# Patient Record
Sex: MALE | Race: WHITE | NOT HISPANIC OR LATINO | Employment: OTHER | URBAN - METROPOLITAN AREA
[De-identification: names, ages, dates, MRNs, and addresses within clinical notes are randomized per-mention and may not be internally consistent; named-entity substitution may affect disease eponyms.]

---

## 2017-02-22 ENCOUNTER — OFFICE VISIT (OUTPATIENT)
Dept: FAMILY MEDICINE | Facility: CLINIC | Age: 67
End: 2017-02-22

## 2017-02-22 VITALS
OXYGEN SATURATION: 96 % | DIASTOLIC BLOOD PRESSURE: 78 MMHG | TEMPERATURE: 98 F | HEART RATE: 94 BPM | HEIGHT: 74 IN | WEIGHT: 217 LBS | BODY MASS INDEX: 27.85 KG/M2 | SYSTOLIC BLOOD PRESSURE: 130 MMHG

## 2017-02-22 DIAGNOSIS — R05.9 COUGH: Primary | ICD-10-CM

## 2017-02-22 LAB
FLUAV AG UPPER RESP QL IA.RAPID: NORMAL
FLUBV AG UPPER RESP QL IA.RAPID: NORMAL

## 2017-02-22 PROCEDURE — 99213 OFFICE O/P EST LOW 20 MIN: CPT | Performed by: FAMILY MEDICINE

## 2017-02-22 PROCEDURE — 87804 INFLUENZA ASSAY W/OPTIC: CPT | Performed by: FAMILY MEDICINE

## 2017-02-22 RX ORDER — BENZONATATE 100 MG/1
100 CAPSULE ORAL 3 TIMES DAILY PRN
Qty: 42 CAPSULE | Refills: 0 | Status: SHIPPED | OUTPATIENT
Start: 2017-02-22 | End: 2018-03-12

## 2017-02-22 NOTE — MR AVS SNAPSHOT
"              After Visit Summary   2017    Felix Millard    MRN: [id number]           Patient Information     Date Of Birth          1950        Visit Information        Provider Department      2017 2:00 PM Diego Feliciano MD [facility]        Today's Diagnoses     Cough    -  1       Follow-ups after your visit        Who to contact     If you have questions or need follow up information about today's clinic visit or your schedule please contact [facility] directly at [phone].  Normal or non-critical lab and imaging results will be communicated to you by MyChart, letter or phone within 4 business days after the clinic has received the results. If you do not hear from us within 7 days, please contact the clinic through [employer] or phone. If you have a critical or abnormal lab result, we will notify you by phone as soon as possible.  Submit refill requests through [employer] or call your pharmacy and they will forward the refill request to us. Please allow 3 business days for your refill to be completed.          Additional Information About Your Visit        MyChart Information     [employer] lets you send messages to your doctor, view your test results, renew your prescriptions, schedule appointments and more. To sign up, go to [email or web address] . Click on \"Log in\" on the left side of the screen, which will take you to the Welcome page. Then click on \"Sign up Now\" on the right side of the page.     You will be asked to enter the access code listed below, as well as some personal information. Please follow the directions to create your username and password.     Your access code is: [email or web address]  Expires: 2017  2:22 PM     Your access code will  in 90 days. If you need help or a new code, please call your [facility] clinic or [phone].        Care EveryWhere ID     This is your Care EveryWhere ID. This could be used by other organizations to access your " "[facility] medical records  [id number]        Your Vitals Were     Pulse Temperature Height Pulse Oximetry BMI (Body Mass Index)       94 98  F (36.7  C) 1.886 m (6' 2.25\") 96% 27.67 kg/m2        Blood Pressure from Last 3 Encounters:   02/22/17 130/78   08/22/16 118/86   08/15/16 164/80    Weight from Last 3 Encounters:   02/22/17 98.4 kg (217 lb)   08/15/16 93.4 kg (206 lb)   05/16/16 91.2 kg (201 lb)              We Performed the Following     Influenza Testing ([facility])          Today's Medication Changes          These changes are accurate as of: 2/22/17  2:22 PM.  If you have any questions, ask your nurse or doctor.               Start taking these medicines.        Dose/Directions    benzonatate 100 MG capsule   Commonly known as:  TESSALON   Used for:  Cough   Started by:  Diego Feliciano MD        Dose:  100 mg   Take 1 capsule (100 mg) by mouth 3 times daily as needed for cough   Quantity:  42 capsule   Refills:  0            Where to get your medicines      These medications were sent to [id number] [place] [facility] [address]  [address] [address]     Phone:  [phone]     benzonatate 100 MG capsule                Primary Care Provider Office Phone # Fax #    Marvel De La Fuente [phone]       [facility] [address] [facility] [address]        Thank you!     Thank you for choosing [facility]  for your care. Our goal is always to provide you with excellent care. Hearing back from our patients is one way we can continue to improve our services. Please take a few minutes to complete the written survey that you may receive in the mail after your visit with us. Thank you!             Your Updated Medication List - Protect others around you: Learn how to safely use, store and throw away your medicines at www.disposemymeds.org.          This list is accurate as of: 2/22/17  2:22 PM.  Always use your most " recent med list.                   Brand Name Dispense Instructions for use    benzonatate 100 MG capsule    TESSALON    42 capsule    Take 1 capsule (100 mg) by mouth 3 times daily as needed for cough       CENTRUM PO      Take 1 tablet by mouth daily Reported on 2/22/2017       COLACE PO          EXCEDRIN EXTRA STRENGTH PO      Take 1 tablet by mouth 2 times daily as needed       RITALIN PO      Take 20 mg by mouth 2 times daily Reported on 2/22/2017       VITAMIN D (CHOLECALCIFEROL) PO      Take 1,000 Units by mouth daily       WELLBUTRIN  MG 12 hr tablet   Generic drug:  buPROPion      Take 150 mg by mouth daily.

## 2017-02-22 NOTE — PROGRESS NOTES
"1 days of runny nose, dry cough and myalgias.. No fever or chills. No SOB or chest pain.  Nonsmoker.  ROS: no nausea or vomiting  OBJECTIVE: /78 (BP Location: Right arm)  Pulse 94  Temp 98  F (36.7  C)  Ht 1.886 m (6' 2.25\")  Wt 98.4 kg (217 lb)  SpO2 96%  BMI 27.67 kg/m2   NAD except fatigue. TM's OK. Turbinates pale and swollen. Pharynx injected. No nodes. Lungs are clear, and cor RRR.  (R05) Cough  (primary encounter diagnosis)  Comment:   Plan: [place] benzonatate (TESSALON)        100 MG capsule                "

## 2017-06-28 ENCOUNTER — OFFICE VISIT (OUTPATIENT)
Dept: FAMILY MEDICINE | Facility: CLINIC | Age: 67
End: 2017-06-28

## 2017-06-28 VITALS
WEIGHT: 216.6 LBS | HEART RATE: 87 BPM | DIASTOLIC BLOOD PRESSURE: 82 MMHG | BODY MASS INDEX: 27.62 KG/M2 | RESPIRATION RATE: 20 BRPM | SYSTOLIC BLOOD PRESSURE: 130 MMHG | TEMPERATURE: 98.4 F | OXYGEN SATURATION: 91 %

## 2017-06-28 DIAGNOSIS — G44.209 TENSION HEADACHE: ICD-10-CM

## 2017-06-28 DIAGNOSIS — R35.0 FREQUENCY OF URINATION: Primary | ICD-10-CM

## 2017-06-28 DIAGNOSIS — J06.9 UPPER RESPIRATORY TRACT INFECTION, UNSPECIFIED TYPE: ICD-10-CM

## 2017-06-28 LAB
BACTERIA URINE: NORMAL
BILIRUB UR QL STRIP: 0
BLOOD URINE DIP: 0
CASTS/LPF: NORMAL
COLOR UR: YELLOW
CRYSTAL URINE: NORMAL
EPITHELIAL CELLS - QUEST: NORMAL
GLUCOSE UR STRIP-MCNC: 0 MG/DL
KETONES UR QL STRIP: 0
LEUKOCYTE ESTERASE URINE DIP: 0
MUCOUS URINE: NORMAL
NITRITE UR QL STRIP: NORMAL
PH UR STRIP: 6 PH (ref 5–9)
PROT UR QL: 0 MG/DL (ref ?–0.01)
RBC URINE: NORMAL (ref 0–3)
SP GR UR STRIP: 1.01 (ref 1–1.02)
UROBILINOGEN UR QL STRIP: 0.2 EU/DL (ref 0.2–1)
WBC URINE: NORMAL (ref 0–3)

## 2017-06-28 PROCEDURE — 99214 OFFICE O/P EST MOD 30 MIN: CPT | Performed by: FAMILY MEDICINE

## 2017-06-28 PROCEDURE — 81003 URINALYSIS AUTO W/O SCOPE: CPT | Performed by: FAMILY MEDICINE

## 2017-06-28 RX ORDER — AZITHROMYCIN 250 MG/1
TABLET, FILM COATED ORAL
Qty: 6 TABLET | Refills: 0 | Status: SHIPPED | OUTPATIENT
Start: 2017-06-28 | End: 2018-03-12

## 2017-06-28 NOTE — MR AVS SNAPSHOT
"              After Visit Summary   6/28/2017    Felix Millard    MRN: [id number]           Patient Information     Date Of Birth          1950        Visit Information        Provider Department      6/28/2017 10:45 AM Manny Cunningham MD [facility]        Today's Diagnoses     Frequency of urination    -  1    Tension headache        Upper respiratory tract infection, unspecified type          Care Instructions      * Tension Headache    Muscle Tension Headache (also called \"stress headache\") is a very common cause of head pain. Under stress, some people tense the muscles of their shoulder, neck and scalp without knowing it. If this lasts long enough, a headache can occur. These headaches can be very painful and last for hours or even days.  Home Care:    If you were given pain medicine for this headache, do not drive yourself home. Arrange for a ride, instead. When you get home, try to sleep. You should feel much better when you wake up.    Heat to the back of your neck may relieve neck spasm.    Drink only clear liquids or eat a very light diet to avoid nausea/vomiting until symptoms improve.  Preventing Future Headaches    Identify the sources of stress in your life. These may not be obvious! Learn new ways to handle your stress, such as regular exercise, biofeedback, self-hypnosis and meditation. For more information about this, consult your doctor or go to a local bookstore and review the many books and tapes on this subject.    At the first sign of a tension headache, take time out if possible. Remove yourself from the stressful situation, find a quiet comfortable place to sit or lie down and let yourself relax. Heat and deep massage of the tight areas in the neck and shoulders may help reduce muscle spasm. Medicine, such as ibuprofen (Advil or Motrin) or a prescribed muscle relaxant may be helpful at this point.  Follow Up with your doctor if the headache is not better within the next 24 " hours. If you have frequent headaches you should discuss a treatment plan with your primary care doctor. Ask if you can have medicine to take at home the next time you get a bad headache. This may avoid the need for a visit to the emergency department in the future. Poorly controlled chronic headaches may require a referral to a neurologist (headache specialist).  Call your Doctor Or Get Prompt Medical Attention if any of the following occur:    Worsening of your head pain or no improvement within 24 hours    Repeated vomiting (unable to keep liquids down)    Fever of 100.4 F (38.0 C) or higher, or as directed by your healthcare provider    Stiff neck    Extreme drowsiness, confusion or fainting    Dizziness, vertigo (dizziness with spinning sensation)    Weakness of an arm or leg or one side of the face    Difficulty with speech or vision    7280-3695 [facility], [address] [place] [address]. All rights reserved. This information is not intended as a substitute for professional medical care. Always follow your healthcare professional's instructions.                Follow-ups after your visit        Who to contact     If you have questions or need follow up information about today's clinic visit or your schedule please contact [facility] directly at [phone].  Normal or non-critical lab and imaging results will be communicated to you by MyChart, letter or phone within 4 business days after the clinic has received the results. If you do not hear from us within 7 days, please contact the clinic through [facility] or phone. If you have a critical or abnormal lab result, we will notify you by phone as soon as possible.  Submit refill requests through [employer] or call your pharmacy and they will forward the refill request to us. Please allow 3 business days for your refill to be completed.          Additional Information About Your Visit        [facility] Information     [employer] lets you send  "messages to your doctor, view your test results, renew your prescriptions, schedule appointments and more. To sign up, go to [email or web address] . Click on \"Log in\" on the left side of the screen, which will take you to the Welcome page. Then click on \"Sign up Now\" on the right side of the page.     You will be asked to enter the access code listed below, as well as some personal information. Please follow the directions to create your username and password.     Your access code is: [email or web address]  Expires: 2017 11:25 AM     Your access code will  in 90 days. If you need help or a new code, please call your [place] clinic or [phone].        Care EveryWhere ID     This is your Care EveryWhere ID. This could be used by other organizations to access your [place] medical records  [id number]        Your Vitals Were     Pulse Temperature Respirations Pulse Oximetry BMI (Body Mass Index)       87 98.4  F (36.9  C) (Oral) 20 91% 27.62 kg/m2        Blood Pressure from Last 3 Encounters:   17 130/82   17 130/78   16 118/86    Weight from Last 3 Encounters:   17 98.2 kg (216 lb 9.6 oz)   17 98.4 kg (217 lb)   08/15/16 93.4 kg (206 lb)              We Performed the Following     Urinalysis w/reflex protein, bili ([facility])          Today's Medication Changes          These changes are accurate as of: 17 11:25 AM.  If you have any questions, ask your nurse or doctor.               Start taking these medicines.        Dose/Directions    azithromycin 250 MG tablet   Commonly known as:  ZITHROMAX   Used for:  Upper respiratory tract infection, unspecified type   Started by:  Manny Cunningham MD        2 pills to start then one daily   Quantity:  6 tablet   Refills:  0            Where to get your medicines      These medications [id number] [place] [facility] [address]  [address] [address] [facility] [address]     Phone:  [phone]     " azithromycin 250 MG tablet                Primary Care Provider Office Phone # Fax #    Marvel De La Fuente [phone]       [facility] [address]  [facility] [address]        Equal Access to Services     SONIA AVILES : Hadjessica brandi ku hadwaleskao Soomaali, waaxda luqadaha, qaybta kaalmada adeegyada, alma brionesn chivo barker lamilanagloria vicente. So [facility] [phone].    ATENCIÓN: Si habla español, tiene a brunner disposición servicios gratuitos de asistencia lingüística. Llame al [phone].    We comply with applicable federal civil rights laws and [place] laws. We do not discriminate on the basis of race, color, national origin, age, disability sex, sexual orientation or gender identity.            Thank you!     Thank you for choosing [facility]  for your care. Our goal is always to provide you with excellent care. Hearing back from our patients is one way we can continue to improve our services. Please take a few minutes to complete the written survey that you may receive in the mail after your visit with us. Thank you!             Your Updated Medication List - Protect others around you: Learn how to safely use, store and throw away your medicines at www.disposemymeds.org.          This list is accurate as of: 6/28/17 11:25 AM.  Always use your most recent med list.                   Brand Name Dispense Instructions for use Diagnosis    azithromycin 250 MG tablet    ZITHROMAX    6 tablet    2 pills to start then one daily    Upper respiratory tract infection, unspecified type       benzonatate 100 MG capsule    TESSALON    42 capsule    Take 1 capsule (100 mg) by mouth 3 times daily as needed for cough    Cough       CENTRUM PO      Take 1 tablet by mouth daily Reported on 2/22/2017        COLACE PO           EXCEDRIN EXTRA STRENGTH PO      Take 1 tablet by mouth 2 times daily as needed    Encounter for medication refill       RITALIN PO      Take 20 mg by mouth 2  times daily Reported on 2/22/2017        VITAMIN D (CHOLECALCIFEROL) PO      Take 1,000 Units by mouth daily        WELLBUTRIN  MG 12 hr tablet   Generic drug:  buPROPion      Take 150 mg by mouth daily.

## 2017-06-28 NOTE — PROGRESS NOTES
Subjective:  Here to discuss the status of the following problem(s):(Unless noted the problem(s) is/are stable and if applicable the medicine(s) being used is/are causing no side effects or problems.)    CC: Cough (deep hacking cough); Headache (body aches x 5 d); and Urinary Problem (frequency in noc)      Patient is a 67-year-old man who is generally healthy.  He presents with complaints of a cough that makes his headache quite severe whenever he coughs.  This been going on for about four days he states that he's had body aches when ice asked him to specify what that means he says his knees and hips are painful.  He states that his neck is a little uncomfortable when he rotates side-to-side but  but looking up and down is not a problem.  With respect to the cough he states he is not short of breath he does not bring up mucus.  He has a strong feeling that he has an upper respiratory infection.  He started our visit by stating that if I don't get an antibiotic I usually come back within 10 days because I need one.       ROS:    No fever, no chills  Pulmonary no shortness of breath for mucus production no wheezing  Cardiac no chest pressure or heaviness.  GI no diarrhea or abdominal pain   frequent urination he mentions this particularly at night.  Skin no recent rashes.  Past Medical History:   Diagnosis Date     ADD (attention deficit disorder)      Depressive disorder, not elsewhere classified      Diverticulosis      Eczema      History of shingles      Hyperlipidaemia LDL goal < 100      Major depression in complete remission (H) 8/15/2016     Seasonal allergies      Tinnitus      Current Outpatient Prescriptions   Medication     azithromycin (ZITHROMAX) 250 MG tablet     benzonatate (TESSALON) 100 MG capsule     Docusate Sodium (COLACE PO)     VITAMIN D, CHOLECALCIFEROL, PO     Methylphenidate HCl (RITALIN PO)     Aspirin-Acetaminophen-Caffeine (EXCEDRIN EXTRA STRENGTH PO)     buPROPion (WELLBUTRIN SR) 150  MG 12 hr tablet     Multiple Vitamins-Minerals (CENTRUM PO)     No current facility-administered medications for this visit.       reports that he quit smoking about 15 years ago. His smoking use included Cigarettes. He has never used smokeless tobacco. He reports that he does not drink alcohol or use illicit drugs.      EXAM:  BP: 130/82   Pulse: 87      /82  Pulse 87  Temp 98.4  F (36.9  C) (Oral)  Resp 20  Wt 98.2 kg (216 lb 9.6 oz)  SpO2 91%  BMI 27.62 kg/m2    Wt Readings from Last 2 Encounters:   06/28/17 98.2 kg (216 lb 9.6 oz)   02/22/17 98.4 kg (217 lb)       BMI= Body mass index is 27.62 kg/(m^2).    EXAM:  He's had no recent travel tall slender man who looks mildly uncomfortable when he coughs.  Respiratory rate is normal he does not look acutely sick.  EYES = NL  EARS= NL  MOUTH =NL  NECK =, Supple no adenopathy.No problems with flexing  LUNGS=NL, No wheezing no rhonchi no rales  COR=NL  ABD=obese soft non tender no hepatomegaly  EXT=No obvious synovitis in hisHandsElbows wrists  MOOD AFFECT =NL    Urinalysis completely normal.  With  Normal specific gravity    Assessment/Plan:  Felix was seen today for cough, headache and urinary problem.    Diagnoses and all orders for this visit:      Tension headache  This seems to be the primarily problem but it is made worse particularly by his cough.  He feels that he has an infection that needs to be treated and it has worked in the past.  We discussed that I'm not finding a clear bacterial infection.  We discussed doing other tests such as a chest x-ray CBC etc. he was not interested.  He usually uses Excedrin for his headaches.  That is not working at this time I've suggested he try Advil or Aleve as prescribed on the bottle    Consider medrol dose pack if is headache does not get better.  It is possible that his neck is the source of his headache  Upper respiratory tract infection, unspecified type  -     azithromycin (ZITHROMAX) 250 MG tablet; 2  pills to start then one daily  There is a possibility that he is having a bacterial upper respiratory infection and therefore I will give him a course of azithromycin    Frequency of urination  -     Urinalysis w/reflex protein, bili ([facility])      Long discussion     >25 minutes spent with patient, greater than 50% spent on discussion/education/planning - as outlined in assessment and plan    Manny Cunningham  [facility]

## 2017-06-28 NOTE — PATIENT INSTRUCTIONS
"  * Tension Headache    Muscle Tension Headache (also called \"stress headache\") is a very common cause of head pain. Under stress, some people tense the muscles of their shoulder, neck and scalp without knowing it. If this lasts long enough, a headache can occur. These headaches can be very painful and last for hours or even days.  Home Care:    If you were given pain medicine for this headache, do not drive yourself home. Arrange for a ride, instead. When you get home, try to sleep. You should feel much better when you wake up.    Heat to the back of your neck may relieve neck spasm.    Drink only clear liquids or eat a very light diet to avoid nausea/vomiting until symptoms improve.  Preventing Future Headaches    Identify the sources of stress in your life. These may not be obvious! Learn new ways to handle your stress, such as regular exercise, biofeedback, self-hypnosis and meditation. For more information about this, consult your doctor or go to a local bookstore and review the many books and tapes on this subject.    At the first sign of a tension headache, take time out if possible. Remove yourself from the stressful situation, find a quiet comfortable place to sit or lie down and let yourself relax. Heat and deep massage of the tight areas in the neck and shoulders may help reduce muscle spasm. Medicine, such as ibuprofen (Advil or Motrin) or a prescribed muscle relaxant may be helpful at this point.  Follow Up with your doctor if the headache is not better within the next 24 hours. If you have frequent headaches you should discuss a treatment plan with your primary care doctor. Ask if you can have medicine to take at home the next time you get a bad headache. This may avoid the need for a visit to the emergency department in the future. Poorly controlled chronic headaches may require a referral to a neurologist (headache specialist).  Call your Doctor Or Get Prompt Medical Attention if any of the following " occur:    Worsening of your head pain or no improvement within 24 hours    Repeated vomiting (unable to keep liquids down)    Fever of 100.4 F (38.0 C) or higher, or as directed by your healthcare provider    Stiff neck    Extreme drowsiness, confusion or fainting    Dizziness, vertigo (dizziness with spinning sensation)    Weakness of an arm or leg or one side of the face    Difficulty with speech or vision    [id number] [employer] [address], [place] [id number]. All rights reserved. This information is not intended as a substitute for professional medical care. Always follow your healthcare professional's instructions.

## 2017-10-26 DIAGNOSIS — Z23 NEED FOR PROPHYLACTIC VACCINATION AND INOCULATION AGAINST INFLUENZA: Primary | ICD-10-CM

## 2017-10-26 PROCEDURE — 90662 IIV NO PRSV INCREASED AG IM: CPT | Performed by: FAMILY MEDICINE

## 2017-10-26 PROCEDURE — G0008 ADMIN INFLUENZA VIRUS VAC: HCPCS | Performed by: FAMILY MEDICINE

## 2017-10-26 NOTE — PROGRESS NOTES

## 2018-03-12 ENCOUNTER — OFFICE VISIT (OUTPATIENT)
Dept: FAMILY MEDICINE | Facility: CLINIC | Age: 68
End: 2018-03-12

## 2018-03-12 VITALS
WEIGHT: 221 LBS | OXYGEN SATURATION: 95 % | DIASTOLIC BLOOD PRESSURE: 100 MMHG | HEIGHT: 75 IN | HEART RATE: 84 BPM | BODY MASS INDEX: 27.48 KG/M2 | SYSTOLIC BLOOD PRESSURE: 146 MMHG | RESPIRATION RATE: 16 BRPM

## 2018-03-12 DIAGNOSIS — Z13.6 SCREENING FOR CARDIOVASCULAR CONDITION: ICD-10-CM

## 2018-03-12 DIAGNOSIS — Z00.00 ROUTINE GENERAL MEDICAL EXAMINATION AT A HEALTH CARE FACILITY: ICD-10-CM

## 2018-03-12 DIAGNOSIS — Z12.5 SCREENING FOR PROSTATE CANCER: ICD-10-CM

## 2018-03-12 DIAGNOSIS — F32.5 MAJOR DEPRESSION IN COMPLETE REMISSION (H): ICD-10-CM

## 2018-03-12 DIAGNOSIS — R06.09 DOE (DYSPNEA ON EXERTION): ICD-10-CM

## 2018-03-12 DIAGNOSIS — Z11.59 NEED FOR HEPATITIS C SCREENING TEST: Primary | ICD-10-CM

## 2018-03-12 DIAGNOSIS — Z23 NEED FOR 23-POLYVALENT PNEUMOCOCCAL POLYSACCHARIDE VACCINE: ICD-10-CM

## 2018-03-12 DIAGNOSIS — F10.21 ALCOHOL DEPENDENCE IN REMISSION (H): ICD-10-CM

## 2018-03-12 DIAGNOSIS — I10 BENIGN ESSENTIAL HYPERTENSION: ICD-10-CM

## 2018-03-12 DIAGNOSIS — F98.8 ATTENTION DEFICIT DISORDER (ADD) WITHOUT HYPERACTIVITY: ICD-10-CM

## 2018-03-12 PROCEDURE — G0439 PPPS, SUBSEQ VISIT: HCPCS | Performed by: FAMILY MEDICINE

## 2018-03-12 PROCEDURE — G0009 ADMIN PNEUMOCOCCAL VACCINE: HCPCS | Performed by: FAMILY MEDICINE

## 2018-03-12 PROCEDURE — 99214 OFFICE O/P EST MOD 30 MIN: CPT | Mod: 25 | Performed by: FAMILY MEDICINE

## 2018-03-12 PROCEDURE — 90732 PPSV23 VACC 2 YRS+ SUBQ/IM: CPT | Performed by: FAMILY MEDICINE

## 2018-03-12 PROCEDURE — 36415 COLL VENOUS BLD VENIPUNCTURE: CPT | Performed by: FAMILY MEDICINE

## 2018-03-12 RX ORDER — VILAZODONE HYDROCHLORIDE 20 MG/1
TABLET ORAL AT BEDTIME
COMMUNITY
End: 2019-06-11

## 2018-03-12 ASSESSMENT — ANXIETY QUESTIONNAIRES
5. BEING SO RESTLESS THAT IT IS HARD TO SIT STILL: NOT AT ALL
7. FEELING AFRAID AS IF SOMETHING AWFUL MIGHT HAPPEN: NOT AT ALL
2. NOT BEING ABLE TO STOP OR CONTROL WORRYING: SEVERAL DAYS
3. WORRYING TOO MUCH ABOUT DIFFERENT THINGS: SEVERAL DAYS
GAD7 TOTAL SCORE: 5
1. FEELING NERVOUS, ANXIOUS, OR ON EDGE: SEVERAL DAYS
6. BECOMING EASILY ANNOYED OR IRRITABLE: MORE THAN HALF THE DAYS
IF YOU CHECKED OFF ANY PROBLEMS ON THIS QUESTIONNAIRE, HOW DIFFICULT HAVE THESE PROBLEMS MADE IT FOR YOU TO DO YOUR WORK, TAKE CARE OF THINGS AT HOME, OR GET ALONG WITH OTHER PEOPLE: SOMEWHAT DIFFICULT

## 2018-03-12 ASSESSMENT — PATIENT HEALTH QUESTIONNAIRE - PHQ9: 5. POOR APPETITE OR OVEREATING: NOT AT ALL

## 2018-03-12 NOTE — PROGRESS NOTES
SUBJECTIVE:   Felix Millard is a 68 year old male who presents for Preventive Visit.  Elevated BP today  Sees Ruth FLORENCE NP - Psych -ritalin   Right ear pain  Derm- left hand  Right foot pain - lump SOB  Sore left shoulder  Are you in the first 12 months of your Medicare Part B coverage?  No    Healthy Habits:    Do you get at least three servings of calcium containing foods daily (dairy, green leafy vegetables, etc.)? No- twice at least daily, vitamin D & MV    Amount of exercise or daily activities, outside of work: 3 day(s) per week    Problems taking medications regularly NO    Medication side effects: Yes dry mouth    Have you had an eye exam in the past two years? yes    Do you see a dentist twice per year? yes    Do you have sleep apnea, excessive snoring or daytime drowsiness?no      Ability to successfully perform activities of daily living: Yes, no assistance needed    Home safety:  lack of grab bars in the bathroom     Hearing impairment: No  2  HEARING FREQUENCY TESTED BOTH EARS:Failed  Right Ear/Left Ear      500 Hz: passed/failed: pass    1000 Hz: Passed   2000 Hz: Passed   4000 Hz: failed right ear - has pain , left ear pass  Georgie Kay MA 3/12/2018  Check for wax or pain in ear            Fall risk:  Fallen 2 or more times in the past year?: No  Any fall with injury in the past year?: No        COGNITIVE SCREEN  1) Repeat 3 items (Banana, Sunrise, Chair)    2) Clock draw: NORMAL  3) 3 item recall: Recalls 3 objects  Results: 3 items recalled: COGNITIVE IMPAIRMENT LESS LIKELY    Mini-CogTM Copyright S Mari. Licensed by the author for use in [facility]; reprinted with permission (federico@.[facility]). All rights reserved.            Depression & ritalin - sees psych     Reviewed and updated as needed this visit by clinical staff  Tobacco  Allergies  Meds         Reviewed and updated as needed this visit by Provider        Social History   Substance Use Topics     Smoking status:  Former Smoker     Types: Cigarettes     Quit date: 11/11/2001     Smokeless tobacco: Never Used     Alcohol use No      Comment: quit oct 1980       If you drink alcohol do you typically have >3 drinks per day or >7 drinks per week? No                        Today's PHQ-2 Score:   PHQ-2 ( 1999 [employer]) 3/12/2018 12/21/2015   Q1: Little interest or pleasure in doing things 1 0   Q2: Feeling down, depressed or hopeless 1 1   PHQ-2 Score 2 1       Do you feel safe in your environment - Yes    Do you have a Health Care Directive - Yes at home - to bring into [facility]    Current providers sharing in care for this patient include:   Patient Care Team:  Marvel De La Fuente MD as PCP - General (Family Practice)    The following health maintenance items are reviewed in Epic and correct as of today:  Health Maintenance   Topic Date Due     HEPATITIS C SCREENING  01/09/1966     ADVANCE DIRECTIVE PLANNING Q5 YRS  01/09/2003     PNEUMOCOCCAL (2 of 2 - PPSV23) 11/23/2016     FALL RISK ASSESSMENT  12/21/2016     PHQ-9 Q6 MONTHS  02/15/2017     COLONOSCOPY Q5 YR  07/23/2018     INFLUENZA VACCINE (SYSTEM ASSIGNED)  09/01/2018     DEPRESSION ACTION PLAN Q1 YR  03/12/2019     LIPID SCREEN Q5 YR MALE (SYSTEM ASSIGNED)  12/22/2020     TETANUS IMMUNIZATION (SYSTEM ASSIGNED)  12/21/2025     AORTIC ANEURYSM SCREENING (SYSTEM ASSIGNED)  Completed     Patient Active Problem List   Diagnosis     ADD (attention deficit disorder)     Seasonal allergies     Diverticulosis     Eczema     Health Care Home     Hx of diverticulitis of colon     Depression     Alcoholism in Sobriety     Major depression in complete remission (H)     Past Surgical History:   Procedure Laterality Date     CHOLECYSTECTOMY, LAPOROSCOPIC  1980's    Cholecystectomy, Laparoscopic     HERNIORRHAPHY VENTRAL N/A 4/28/2016    Procedure: HERNIORRHAPHY VENTRAL;  Surgeon: Igor Mcnair MD;  Location: [facility]     LAPAROSCOPIC APPENDECTOMY N/A 6/18/2015    Procedure: LAPAROSCOPIC  "APPENDECTOMY;  Surgeon: Igor Mcnair MD;  Location:  OR     LAPAROSCOPIC ASSISTED COLECTOMY LEFT (DESCENDING) Left 6/18/2015    Procedure: LAPAROSCOPIC ASSISTED COLECTOMY LEFT (DESCENDING);  Surgeon: Igor Mcnair MD;  Location:  OR     LAPAROSCOPIC HERNIORRHAPHY INGUINAL BILATERAL Bilateral 4/28/2016    Procedure: LAPAROSCOPIC HERNIORRHAPHY INGUINAL BILATERAL;  Surgeon: Igor Mcnair MD;  [facility]     LAPAROSCOPIC HERNIORRHAPHY VENTRAL N/A 6/18/2015    Procedure: LAPAROSCOPIC HERNIORRHAPHY VENTRAL;  Surgeon: Igor Mcnair MD;  Location:  OR     LAPAROSCOPIC HERNIORRHAPHY VENTRAL N/A 4/28/2016    Procedure: LAPAROSCOPIC HERNIORRHAPHY VENTRAL;  Surgeon: Igor Mcnair MD;  [facility]     LAPAROSCOPIC LYSIS ADHESIONS N/A 6/18/2015    Procedure: LAPAROSCOPIC LYSIS ADHESIONS;  Surgeon: Igor Mcnair MD;  Location:  OR     PHACOEMULSIFICATION CLEAR CORNEA WITH STANDARD INTRAOCULAR LENS IMPLANT Right 8/22/2016    Procedure: PHACOEMULSIFICATION CLEAR CORNEA WITH STANDARD INTRAOCULAR LENS IMPLANT;  Surgeon: Jaylan Mills MD;  Location:  [facility]     VARICOCELECTOMY  1950's       Social History   Substance Use Topics     Smoking status: Former Smoker     Types: Cigarettes     Quit date: 11/11/2001     Smokeless tobacco: Never Used     Alcohol use No      Comment: quit oct 1980     Family History   Problem Relation Age of Onset     CEREBROVASCULAR DISEASE Father      DIABETES Maternal Grandmother            Pneumonia Vaccine:Adults age 65+ who have not received previous Pneumovax (PPSV23) or PCV13 as an adult: Should first be given PCV13 AND then should be given PPSV23 6-12 months after PCV13    ROS:  Constitutional, HEENT, cardiovascular, pulmonary, GI, , musculoskeletal, neuro, skin, endocrine and psych systems are negative, except as otherwise noted.    OBJECTIVE:   BP (!) 146/100  Pulse 84  Resp 16  Ht 1.905 m (6' 3\")  Wt 100.2 kg (221 lb)  " "SpO2 95%  BMI 27.62 kg/m2 Estimated body mass index is 27.62 kg/(m^2) as calculated from the following:    Height as of this encounter: 1.905 m (6' 3\").    Weight as of this encounter: 100.2 kg (221 lb).  EXAM:   GENERAL: healthy, alert and no distress  EYES: Eyes grossly normal to inspection, PERRL and conjunctivae and sclerae normal  HENT: ear canals and TM's normal, nose and mouth without ulcers or lesions  NECK: no adenopathy, no asymmetry, masses, or scars and thyroid normal to palpation  RESP: lungs clear to auscultation - no rales, rhonchi or wheezes  BREAST: normal without masses, tenderness or nipple discharge and no palpable axillary masses or adenopathy  CV: regular rate and rhythm, normal S1 S2, no S3 or S4, no murmur, click or rub, no peripheral edema and peripheral pulses strong  ABDOMEN: soft, nontender, no hepatosplenomegaly, no masses and bowel sounds normal   (male): normal male genitalia without lesions or urethral discharge, no hernia  RECTAL: normal sphincter tone, no rectal masses, prostate normal size, smooth, nontender without nodules or masses  MS: no gross musculoskeletal defects noted, no edema  SKIN: no suspicious lesions or rashes  NEURO: Normal strength and tone, mentation intact and speech normal  PSYCH: mentation appears normal, affect normal/bright    ASSESSMENT / PLAN:   Felix was seen today for physical, hearing screening and hypertension.    Diagnoses and all orders for this visit:    Need for hepatitis C screening test  -     HCV Antibody ([facility])    Need for 23-polyvalent pneumococcal polysaccharide vaccine  -     PNEUMOCOCCAL VACCINE,ADULT,SQ OR IM    Major depression in complete remission (H)  Doing well  Alcoholism in Sobriety  Sobriety continue    Attention deficit disorder (ADD) without hyperactivity  Treated by psych  Screening for cardiovascular condition  -     Lipid Panel ([facility])  -     Comp. Metabolic Panel (14) ([facility])    Screening for prostate cancer  -     " "PSA Serum ([facility])    Benign essential hypertension  See patient instructions  Discussed hypertension in detail including JNC and American Heart Association guidelines for blood pressure goals.  Discussed indication for treatment and treatment options.  Discussed the importance for aggressive management of HTN to prevent vascular complications later.  Recommended lower fat, lower carbohydrate, and lower sodium (<2000 mg)diet.  Discussed required intervals for follow up on HTN, lab studies, and the need to aggresive management of other cardiac disease risk factors.  Recommened pt. follow their blood pressures outside the clinic to ensure that BPs are remaining within guidelines, and to contact me if the readings are not within guidelines so we can adjust treatment as needed.    MARTIN (dyspnea on exertion)  -     Exercise Stress test; Future    Routine general medical examination at a health care facility    Other orders  -     DEPRESSION ACTION PLAN (DAP)        End of Life Planning:  Patient currently has an advanced directive: Yes.  Practitioner is supportive of decision.    COUNSELING:  Reviewed preventive health counseling, as reflected in patient instructions        Estimated body mass index is 27.62 kg/(m^2) as calculated from the following:    Height as of this encounter: 1.905 m (6' 3\").    Weight as of this encounter: 100.2 kg (221 lb).       reports that he quit smoking about 16 years ago. His smoking use included Cigarettes. He has never used smokeless tobacco.      Appropriate preventive services were discussed with this patient, including applicable screening as appropriate for cardiovascular disease, diabetes, osteopenia/osteoporosis, and glaucoma.  As appropriate for age/gender, discussed screening for colorectal cancer, prostate cancer, breast cancer, and cervical cancer. Checklist reviewing preventive services available has been given to the patient.    Reviewed patients plan of care and provided an " AVS. The Basic Care Plan (routine screening as documented in Health Maintenance) for Felix meets the Care Plan requirement. This Care Plan has been established and reviewed with the Patient.    Counseling Resources:  ATP IV Guidelines  Pooled Cohorts Equation Calculator  Breast Cancer Risk Calculator  FRAX Risk Assessment  ICSI Preventive Guidelines  Dietary Guidelines for Americans, 2010  USDA's MyPlate  ASA Prophylaxis  Lung CA Screening    Marvel De La Fuente MD  [facility]

## 2018-03-12 NOTE — PATIENT INSTRUCTIONS
Hypertension   What is hypertension?   Hypertension is blood pressure that keeps being higher than normal. Blood pressure is the force of blood against artery walls as the heart pumps blood through the body. Blood pressure can be unhealthy if it is above 120/80. The higher your blood pressure, the greater the health risk.   High blood pressure can be controlled if you take these steps:   Maintain a healthy weight.   Are physically active.   Follow a healthy eating plan, which includes foods that do not have a lot of salt and sodium and do not add salt to any foods.   Do not drink a lot of alcohol.   Our goal is to keep the systolic (top) number 138 or lower and the diastolic (bottom) number 83 or lower                  Recheck here on the results for the BP in 6-8 weeks.                   Dietary Approaches to Stop Hypertension (The DASH Diet)   What is hypertension?   Hypertension is blood pressure that keeps being higher than normal. Blood pressure is the force of blood against artery walls as the heart pumps blood through the body. Blood pressure can be unhealthy if it is above 120/80. The higher your blood pressure, the greater the health risk.   High blood pressure can be controlled if you take these steps:   Maintain a healthy weight.   Are physically active.   Follow a healthy eating plan, which includes foods that do not have a lot of salt and sodium.   Do not drink a lot of alcohol.   Diet affects high blood pressure. Following the DASH diet and reducing the amount of sodium in your diet will help lower your blood pressure. It will also help prevent high blood pressure.   What is the DASH diet?   Dietary Approaches to Stop Hypertension (DASH) is a diet that is low in saturated fat, cholesterol, and total fat. It emphasizes fruits, vegetables, and low-fat dairy foods. The DASH diet also includes whole-grain products, fish, poultry, and nuts. It encourages fewer servings of red meat, sweets, and  sugar-containing beverages. It is rich in magnesium, potassium, and calcium, as well as protein and fiber.   How do I get started on the DASH diet?   The DASH diet requires no special foods and has no hard-to-follow recipes. Start by seeing how DASH compares with your current eating habits.   The DASH eating plan illustrated below is based on a diet of 2,000 calories a day. Your healthcare provider or a dietitian can help you determine how many calories a day you need. Most adults need somewhere between 1600 and 2800 calories a day. Serving sizes for different foods vary from 1/2 cup to 1 and 1/4 cups. Check product nutrition labels for serving sizes and the number of calories per serving.                      Number of        Examples of  Food Group      servings         serving size  ----------------------------------------------------------------    Grains and      7 to 8 per day   1 slice of bread  grain products                   1 cup ready-to-eat cold cereal                                   1/2 cup cooked rice, pasta,                                   or cereal    Vegetables      4 to 5 per day   1 cup raw leafy vegetable                                   1/2 cup cooked vegetable                                   6 ounces (oz) vegetable juice      Fruits          4 to 5 per day   1 medium fruit                                   1/4 cup dried fruit                                   1/2 cup fresh, frozen, or                                   canned fruit                                   6 oz fruit juice      Low-fat or      2 to 3 per day   8 oz milk  fat-free                         1 cup yogurt  dairy foods                      1 and 1/2 oz cheese    Lean meats,  poultry,        2 or fewer per   3 oz cooked lean meat,  or fish         day              skinless poultry, or fish    Nuts, seeds,    4 to 5 per week  1/3 cup or 1 and 1/2 oz nuts  and dry beans                    1 tablespoon or 1/2 oz seeds                                    1/2 cup cooked dry beans    Fats and oils   2 to 3 per day   1 teaspoon soft margarine                                   1 tablespoon low-fat mayonnaise                                   2 tablespoons light salad                                   dressing                                   1 teaspoon vegetable oil    Sweets          5 per week       1 tablespoon sugar                                   1 tablespoon jelly or jam                                   1/2 oz jelly beans                                   8 oz lemonade  ----------------------------------------------------------------  Make changes gradually. Here are some suggestions that might help:   If you now eat 1 or 2 servings of vegetables a day, add a serving at lunch and another at dinner.   If you have not been eating fruit regularly, or have only juice at breakfast, add a serving to your meals or have it as a snack.   Drink milk or water with lunch or dinner instead of soda, sugar-sweetened tea, or alcohol. Choose low-fat (1%) or fat-free (nonfat) dairy products so that you are eating fewer calories and less saturated fat, total fat, and cholesterol.   Read food labels on margarines and salad dressings to choose products lowest in fat.   If you now eat large portions of meat, slowly cut back--by a half or a third at each meal. Limit meat to 6 ounces a day (two 3-ounce servings). Three to 4 ounces is about the size of a deck of cards.   Have 2 or more meatless meals each week. Increase servings of vegetables, rice, pasta, and beans in all meals. Try casseroles, pasta, and stir-barrios dishes, which have less meat and more vegetables, grains, and beans.   Use fruits canned in their own juice. Fresh fruits require little or no preparation. Dried fruits are a good choice to carry with you or to have ready in the car.   Try these snacks ideas: unsalted pretzels or nuts mixed with raisins, marry crackers, low-fat and fat-free  yogurt or frozen yogurt, popcorn with no salt or butter added, and raw vegetables.   Choose whole-grain foods to get more nutrients, including minerals and fiber. For example, choose whole-wheat bread, whole-grain cereals, or brown rice.   Use fresh, frozen, or no-salt-added canned vegetables.   Remember to also reduce the salt and sodium in your diet. Try to have no more than 2000 milligrams (mg) of sodium per day, with a goal of further reducing the sodium to 1500 mg per day. Three important ways to reduce sodium are:   Eat food products with reduced-sodium or no salt added.   Use less salt when you prepare foods and do not add salt to your food at the table.   Read food labels. Aim for foods that contain less than 5% of the daily value of sodium.   The DASH eating plan is not designed for weight loss. But it contains many lower-calorie foods, such as fruits and vegetables. You can make it lower in calories by replacing high-calorie foods with more fruits and vegetables. Some ideas to increase fruits and vegetables and decrease calories include:   Eat a medium apple instead of 4 shortbread cookies. You'll save 80 calories.   Eat 1/4 cup of dried apricots instead of a 2-ounce bag of pork rinds. You'll save 230 calories.   Have a hamburger that's 3 ounces instead of 6 ounces. Add a 1/2 cup serving of carrots and a 1/2 cup serving of spinach. You'll save more than 200 calories.   Instead of 5 ounces of chicken, have a stir barrios with 2 ounces of chicken and 1 and 1/2 cups of raw vegetables. Use just a small amount of vegetable oil. You'll save 50 calories.   Have a 1/2 cup serving of low-fat frozen yogurt instead of a 1-and-1/2-ounce chocolate bar. You'll save about 110 calories.   Use low-fat or fat-free condiments, such as fat-free salad dressings.   Eat smaller portions. Cut back gradually.   Use food labels to compare fat and calorie content in packaged foods. Items marked low fat or fat free may be lower in fat  but not lower in calories than their regular versions.   Limit foods with lots of added sugar, such as pies, flavored yogurts, candy bars, ice cream, sherbet, regular soft drinks, and fruit drinks.   Drink water or club soda instead of cola or other soda drinks.     For more information, see the National Heart, Lung, and Blood [place] Web site at: http://www.nhlbi.nih.gov/health/public/heart/hbp/dash/.     Preventive Health Recommendations:       Male Ages 65 and over    Yearly exam:             See your health care provider every year in order to  o   Review health changes.   o   Discuss preventive care.    o   Review your medicines if your doctor has prescribed any.    Talk with your health care provider about whether you should have a test to screen for prostate cancer (PSA).    Every 3 years, have a diabetes test (fasting glucose). If you are at risk for diabetes, you should have this test more often.    Every 5 years, have a cholesterol test. Have this test more often if you are at risk for high cholesterol or heart disease.     Every 10 years, have a colonoscopy. Or, have a yearly FIT test (stool test). These exams will check for colon cancer.    Talk to with your health care provider about screening for Abdominal Aortic Aneurysm if you have a family history of AAA or have a history of smoking.  Shots:     Get a flu shot each year.     Get a tetanus shot every 10 years.     Talk to your doctor about your pneumonia vaccines. There are now two you should receive - Pneumovax (PPSV 23) and Prevnar (PCV 13).    Talk to your doctor about a shingles vaccine.     Talk to your doctor about the hepatitis B vaccine.  Nutrition:     Eat at least 5 servings of fruits and vegetables each day.     Eat whole-grain bread, whole-wheat pasta and brown rice instead of white grains and rice.     Talk to your doctor about Calcium and Vitamin D.   Lifestyle    Exercise for at least 150 minutes a week (30 minutes a day, 5 days a  week). This will help you control your weight and prevent disease.     Limit alcohol to one drink per day.     No smoking.     Wear sunscreen to prevent skin cancer.     See your dentist every six months for an exam and cleaning.     See your eye doctor every 1 to 2 years to screen for conditions such as glaucoma, macular degeneration and cataracts.

## 2018-03-12 NOTE — MR AVS SNAPSHOT
After Visit Summary   3/12/2018    Felix Millard    MRN: [id number]           Patient Information     Date Of Birth          1950        Visit Information        Provider Department      3/12/2018 9:30 AM Marvel De La Fuente MD [facility]        Today's Diagnoses     Need for hepatitis C screening test    -  1    Need for 23-polyvalent pneumococcal polysaccharide vaccine        Major depression in complete remission (H)        Alcoholism in Sobriety        Attention deficit disorder (ADD) without hyperactivity        Screening for cardiovascular condition        Screening for prostate cancer        Benign essential hypertension        MARTIN (dyspnea on exertion)        Routine general medical examination at a health care facility          Care Instructions      Hypertension   What is hypertension?   Hypertension is blood pressure that keeps being higher than normal. Blood pressure is the force of blood against artery walls as the heart pumps blood through the body. Blood pressure can be unhealthy if it is above 120/80. The higher your blood pressure, the greater the health risk.   High blood pressure can be controlled if you take these steps:   Maintain a healthy weight.   Are physically active.   Follow a healthy eating plan, which includes foods that do not have a lot of salt and sodium and do not add salt to any foods.   Do not drink a lot of alcohol.   Our goal is to keep the systolic (top) number 138 or lower and the diastolic (bottom) number 83 or lower                  Recheck here on the results for the BP in 6-8 weeks.                   Dietary Approaches to Stop Hypertension (The DASH Diet)   What is hypertension?   Hypertension is blood pressure that keeps being higher than normal. Blood pressure is the force of blood against artery walls as the heart pumps blood through the body. Blood pressure can be unhealthy if it is above 120/80. The higher your blood pressure, the  greater the health risk.   High blood pressure can be controlled if you take these steps:   Maintain a healthy weight.   Are physically active.   Follow a healthy eating plan, which includes foods that do not have a lot of salt and sodium.   Do not drink a lot of alcohol.   Diet affects high blood pressure. Following the DASH diet and reducing the amount of sodium in your diet will help lower your blood pressure. It will also help prevent high blood pressure.   What is the DASH diet?   Dietary Approaches to Stop Hypertension (DASH) is a diet that is low in saturated fat, cholesterol, and total fat. It emphasizes fruits, vegetables, and low-fat dairy foods. The DASH diet also includes whole-grain products, fish, poultry, and nuts. It encourages fewer servings of red meat, sweets, and sugar-containing beverages. It is rich in magnesium, potassium, and calcium, as well as protein and fiber.   How do I get started on the DASH diet?   The DASH diet requires no special foods and has no hard-to-follow recipes. Start by seeing how DASH compares with your current eating habits.   The DASH eating plan illustrated below is based on a diet of 2,000 calories a day. Your healthcare provider or a dietitian can help you determine how many calories a day you need. Most adults need somewhere between 1600 and 2800 calories a day. Serving sizes for different foods vary from 1/2 cup to 1 and 1/4 cups. Check product nutrition labels for serving sizes and the number of calories per serving.                      Number of        Examples of  Food Group      servings         serving size  ----------------------------------------------------------------    Grains and      7 to 8 per day   1 slice of bread  grain products                   1 cup ready-to-eat cold cereal                                   1/2 cup cooked rice, pasta,                                   or cereal    Vegetables      4 to 5 per day   1 cup raw leafy vegetable                                    1/2 cup cooked vegetable                                   6 ounces (oz) vegetable juice      Fruits          4 to 5 per day   1 medium fruit                                   1/4 cup dried fruit                                   1/2 cup fresh, frozen, or                                   canned fruit                                   6 oz fruit juice      Low-fat or      2 to 3 per day   8 oz milk  fat-free                         1 cup yogurt  dairy foods                      1 and 1/2 oz cheese    Lean meats,  poultry,        2 or fewer per   3 oz cooked lean meat,  or fish         day              skinless poultry, or fish    Nuts, seeds,    4 to 5 per week  1/3 cup or 1 and 1/2 oz nuts  and dry beans                    1 tablespoon or 1/2 oz seeds                                   1/2 cup cooked dry beans    Fats and oils   2 to 3 per day   1 teaspoon soft margarine                                   1 tablespoon low-fat mayonnaise                                   2 tablespoons light salad                                   dressing                                   1 teaspoon vegetable oil    Sweets          5 per week       1 tablespoon sugar                                   1 tablespoon jelly or jam                                   1/2 oz jelly beans                                   8 oz lemonade  ----------------------------------------------------------------  Make changes gradually. Here are some suggestions that might help:   If you now eat 1 or 2 servings of vegetables a day, add a serving at lunch and another at dinner.   If you have not been eating fruit regularly, or have only juice at breakfast, add a serving to your meals or have it as a snack.   Drink milk or water with lunch or dinner instead of soda, sugar-sweetened tea, or alcohol. Choose low-fat (1%) or fat-free (nonfat) dairy products so that you are eating fewer calories and less saturated fat, total fat, and  cholesterol.   Read food labels on margarines and salad dressings to choose products lowest in fat.   If you now eat large portions of meat, slowly cut back--by a half or a third at each meal. Limit meat to 6 ounces a day (two 3-ounce servings). Three to 4 ounces is about the size of a deck of cards.   Have 2 or more meatless meals each week. Increase servings of vegetables, rice, pasta, and beans in all meals. Try casseroles, pasta, and stir-barrios dishes, which have less meat and more vegetables, grains, and beans.   Use fruits canned in their own juice. Fresh fruits require little or no preparation. Dried fruits are a good choice to carry with you or to have ready in the car.   Try these snacks ideas: unsalted pretzels or nuts mixed with raisins, marry crackers, low-fat and fat-free yogurt or frozen yogurt, popcorn with no salt or butter added, and raw vegetables.   Choose whole-grain foods to get more nutrients, including minerals and fiber. For example, choose whole-wheat bread, whole-grain cereals, or brown rice.   Use fresh, frozen, or no-salt-added canned vegetables.   Remember to also reduce the salt and sodium in your diet. Try to have no more than 2000 milligrams (mg) of sodium per day, with a goal of further reducing the sodium to 1500 mg per day. Three important ways to reduce sodium are:   Eat food products with reduced-sodium or no salt added.   Use less salt when you prepare foods and do not add salt to your food at the table.   Read food labels. Aim for foods that contain less than 5% of the daily value of sodium.   The DASH eating plan is not designed for weight loss. But it contains many lower-calorie foods, such as fruits and vegetables. You can make it lower in calories by replacing high-calorie foods with more fruits and vegetables. Some ideas to increase fruits and vegetables and decrease calories include:   Eat a medium apple instead of 4 shortbread cookies. You'll save 80 calories.   Eat 1/4  cup of dried apricots instead of a 2-ounce bag of pork rinds. You'll save 230 calories.   Have a hamburger that's 3 ounces instead of 6 ounces. Add a 1/2 cup serving of carrots and a 1/2 cup serving of spinach. You'll save more than 200 calories.   Instead of 5 ounces of chicken, have a stir barrios with 2 ounces of chicken and 1 and 1/2 cups of raw vegetables. Use just a small amount of vegetable oil. You'll save 50 calories.   Have a 1/2 cup serving of low-fat frozen yogurt instead of a 1-and-1/2-ounce chocolate bar. You'll save about 110 calories.   Use low-fat or fat-free condiments, such as fat-free salad dressings.   Eat smaller portions. Cut back gradually.   Use food labels to compare fat and calorie content in packaged foods. Items marked low fat or fat free may be lower in fat but not lower in calories than their regular versions.   Limit foods with lots of added sugar, such as pies, flavored yogurts, candy bars, ice cream, sherbet, regular soft drinks, and fruit drinks.   Drink water or club soda instead of cola or other soda drinks.     For more information, see the National Heart, Lung, and Blood Gravity Web site at: http://www.nhlbi.nih.gov/health/public/heart/hbp/dash/.     Preventive Health Recommendations:       Male Ages 65 and over    Yearly exam:             See your health care provider every year in order to  o   Review health changes.   o   Discuss preventive care.    o   Review your medicines if your doctor has prescribed any.    Talk with your health care provider about whether you should have a test to screen for prostate cancer (PSA).    Every 3 years, have a diabetes test (fasting glucose). If you are at risk for diabetes, you should have this test more often.    Every 5 years, have a cholesterol test. Have this test more often if you are at risk for high cholesterol or heart disease.     Every 10 years, have a colonoscopy. Or, have a yearly FIT test (stool test). These exams will check for  colon cancer.    Talk to with your health care provider about screening for Abdominal Aortic Aneurysm if you have a family history of AAA or have a history of smoking.  Shots:     Get a flu shot each year.     Get a tetanus shot every 10 years.     Talk to your doctor about your pneumonia vaccines. There are now two you should receive - Pneumovax (PPSV 23) and Prevnar (PCV 13).    Talk to your doctor about a shingles vaccine.     Talk to your doctor about the hepatitis B vaccine.  Nutrition:     Eat at least 5 servings of fruits and vegetables each day.     Eat whole-grain bread, whole-wheat pasta and brown rice instead of white grains and rice.     Talk to your doctor about Calcium and Vitamin D.   Lifestyle    Exercise for at least 150 minutes a week (30 minutes a day, 5 days a week). This will help you control your weight and prevent disease.     Limit alcohol to one drink per day.     No smoking.     Wear sunscreen to prevent skin cancer.     See your dentist every six months for an exam and cleaning.     See your eye doctor every 1 to 2 years to screen for conditions such as glaucoma, macular degeneration and cataracts.          Follow-ups after your visit        Future tests that were ordered for you today     Open Future Orders        Priority Expected Expires Ordered    Exercise Stress test Routine  3/12/2019 3/12/2018            Who to contact     If you have questions or need follow up information about today's clinic visit or your schedule please contact [facility] directly at [phone].  Normal or non-critical lab and imaging results will be communicated to you by MyChart, letter or phone within 4 business days after the clinic has received the results. If you do not hear from us within 7 days, please contact the clinic through Ceroshart or phone. If you have a critical or abnormal lab result, we will notify you by phone as soon as possible.  Submit refill requests through Down or call  "your pharmacy and they will forward the refill request to us. Please allow 3 business days for your refill to be completed.          Additional Information About Your Visit        MyChart Information     EMcube lets you send messages to your doctor, view your test results, renew your prescriptions, schedule appointments and more. To sign up, go to [email or web address] . Click on \"Log in\" on the left side of the screen, which will take you to the Welcome page. Then click on \"Sign up Now\" on the right side of the page.     You will be asked to enter the access code listed below, as well as some personal information. Please follow the directions to create your username and password.     Your access code is: [email or web address]  Expires: 6/10/2018 10:24 AM     Your access code will  in 90 days. If you need help or a new code, please call your [facility] clinic or [phone].        Care EveryWhere ID     This is your Care EveryWhere ID. This could be used by other organizations to access your [facility] medical records  [id number]        Your Vitals Were     Pulse Respirations Height Pulse Oximetry BMI (Body Mass Index)       84 16 1.905 m (6' 3\") 95% 27.62 kg/m2        Blood Pressure from Last 3 Encounters:   18 (!) 146/100   17 130/82   17 130/78    Weight from Last 3 Encounters:   18 100.2 kg (221 lb)   17 98.2 kg (216 lb 9.6 oz)   17 98.4 kg (217 lb)              We Performed the Following     Comp. Metabolic Panel (14) ([facility])     DEPRESSION ACTION PLAN (DAP)     HCV Antibody ([facility])     Lipid Panel ([facility])     PNEUMOCOCCAL VACCINE,ADULT,SQ OR IM     PSA Serum ([facility])        Primary Care Provider Office Phone # Fax #    Marvel De La Fuente [phone] [address] [facility] [id number]        Equal Access to Services     SONIA AVILES : Gonzalo Pitts, alba schwarz, alma perdue" lucero donnelly ah. So [facility] [phone].    ATENCIÓN: Si habla ramos, tiene a brunner disposición servicios gratuitos de asistencia lingüística. Rodrigue al [phone].    We comply with applicable federal civil rights laws and [place] laws. We do not discriminate on the basis of race, color, national origin, age, disability, sex, sexual orientation, or gender identity.            Thank you!     Thank you for choosing [facility]  for your care. Our goal is always to provide you with excellent care. Hearing back from our patients is one way we can continue to improve our services. Please take a few minutes to complete the written survey that you may receive in the mail after your visit with us. Thank you!             Your Updated Medication List - Protect others around you: Learn how to safely use, store and throw away your medicines at www.disposemymeds.org.          This list is accurate as of 3/12/18 10:28 AM.  Always use your most recent med list.                   Brand Name Dispense Instructions for use Diagnosis    CENTRUM PO      Take 1 tablet by mouth daily Reported on 2/22/2017        EXCEDRIN EXTRA STRENGTH PO      Take 1 tablet by mouth 2 times daily as needed    Encounter for medication refill       RITALIN PO      Take 20 mg by mouth 2 times daily Reported on 2/22/2017        VIIBRYD 20 MG Tabs tablet   Generic drug:  vilazodone      Take by mouth At Bedtime        VITAMIN D (CHOLECALCIFEROL) PO      Take 1,000 Units by mouth daily        WELLBUTRIN  MG 12 hr tablet   Generic drug:  buPROPion      Take 150 mg by mouth daily.

## 2018-03-13 LAB
ALBUMIN SERPL-MCNC: 4.7 G/DL (ref 3.5–4.8)
ALBUMIN/GLOB SERPL: 1.8 {RATIO} (ref 1.2–2.2)
ALP SERPL-CCNC: 52 IU/L (ref 39–117)
ALT SERPL-CCNC: 27 IU/L (ref 0–44)
AST SERPL-CCNC: 24 IU/L (ref 0–40)
BILIRUB SERPL-MCNC: 0.8 MG/DL (ref 0–1.2)
BUN SERPL-MCNC: 21 MG/DL (ref 8–27)
BUN/CREATININE RATIO: 16 (ref 10–24)
CALCIUM SERPL-MCNC: 9.2 MG/DL (ref 8.6–10.2)
CHLORIDE SERPLBLD-SCNC: 102 MMOL/L (ref 96–106)
CHOLEST SERPL-MCNC: 263 MG/DL (ref 100–199)
CREAT SERPL-MCNC: 1.34 MG/DL (ref 0.76–1.27)
EGFR IF AFRICN AM: 62 ML/MIN/1.73
EGFR IF NONAFRICN AM: 53 ML/MIN/1.73
GLOBULIN, TOTAL: 2.6 G/DL (ref 1.5–4.5)
GLUCOSE SERPL-MCNC: 125 MG/DL (ref 65–99)
HCV AB SERPL QL IA: <0.1 S/CO RATIO (ref 0–0.9)
HDLC SERPL-MCNC: 50 MG/DL
LDL/HDL RATIO: 3.3 RATIO UNITS (ref 0–3.6)
LDLC SERPL CALC-MCNC: 165 MG/DL (ref 0–99)
POTASSIUM SERPL-SCNC: 4.6 MMOL/L (ref 3.5–5.2)
PROT SERPL-MCNC: 7.3 G/DL (ref 6–8.5)
PSA NG/ML: 1.1 NG/ML (ref 0–4)
SODIUM SERPL-SCNC: 140 MMOL/L (ref 134–144)
TOTAL CO2: 20 MMOL/L (ref 18–28)
TRIGL SERPL-MCNC: 242 MG/DL (ref 0–149)
VLDLC SERPL CALC-MCNC: 48 MG/DL (ref 5–40)

## 2018-03-13 ASSESSMENT — ANXIETY QUESTIONNAIRES: GAD7 TOTAL SCORE: 5

## 2018-03-13 ASSESSMENT — PATIENT HEALTH QUESTIONNAIRE - PHQ9: SUM OF ALL RESPONSES TO PHQ QUESTIONS 1-9: 4

## 2018-03-29 ENCOUNTER — HOSPITAL ENCOUNTER (OUTPATIENT)
Dept: CARDIOLOGY | Facility: CLINIC | Age: 68
Discharge: HOME | End: 2018-03-29
Attending: FAMILY MEDICINE | Admitting: FAMILY MEDICINE
Payer: MEDICARE

## 2018-03-29 DIAGNOSIS — R06.09 DOE (DYSPNEA ON EXERTION): ICD-10-CM

## 2018-03-29 PROCEDURE — 93017 CV STRESS TEST TRACING ONLY: CPT

## 2018-03-29 PROCEDURE — 93016 CV STRESS TEST SUPVJ ONLY: CPT | Performed by: INTERNAL MEDICINE

## 2018-03-29 PROCEDURE — 93018 CV STRESS TEST I&R ONLY: CPT | Performed by: INTERNAL MEDICINE

## 2018-04-02 ENCOUNTER — TELEPHONE (OUTPATIENT)
Dept: FAMILY MEDICINE | Facility: CLINIC | Age: 68
End: 2018-04-02

## 2018-04-02 NOTE — TELEPHONE ENCOUNTER
3/20/18 left message to call nurse regarding elevated lipids.     4/2/18 Called patient again and able to connect. Patient wants results mailed to him to review before discussing whether he wants to try another statin.   Tried atorvastatin 10mg [id number] - patient stopped med on his own, he does not recall why he stopped it.   Mailed results with note to contact nurse if wants to proceed with different statin or if has questions. Also offered appt with Dr. De La Fuente if wants to discuss in more detail.  Precious Ponce RN

## 2018-04-03 NOTE — PROGRESS NOTES
Dear Felix,   I am writing to report that your included test results are within expected ranges. I do not suggest that we make any changes at this time.    Marvel De La Fuente M.D.

## 2018-09-29 ENCOUNTER — HEALTH MAINTENANCE LETTER (OUTPATIENT)
Age: 68
End: 2018-09-29

## 2018-11-27 ENCOUNTER — OFFICE VISIT (OUTPATIENT)
Dept: FAMILY MEDICINE | Facility: CLINIC | Age: 68
End: 2018-11-27

## 2018-11-27 VITALS
SYSTOLIC BLOOD PRESSURE: 132 MMHG | HEART RATE: 82 BPM | DIASTOLIC BLOOD PRESSURE: 78 MMHG | RESPIRATION RATE: 16 BRPM | WEIGHT: 223 LBS | BODY MASS INDEX: 27.87 KG/M2 | OXYGEN SATURATION: 97 %

## 2018-11-27 DIAGNOSIS — J20.9 ACUTE BRONCHITIS, UNSPECIFIED ORGANISM: Primary | ICD-10-CM

## 2018-11-27 DIAGNOSIS — J01.90 ACUTE NON-RECURRENT SINUSITIS, UNSPECIFIED LOCATION: ICD-10-CM

## 2018-11-27 PROCEDURE — 99213 OFFICE O/P EST LOW 20 MIN: CPT | Performed by: FAMILY MEDICINE

## 2018-11-27 RX ORDER — DOXYCYCLINE 100 MG/1
100 CAPSULE ORAL 2 TIMES DAILY
Qty: 20 CAPSULE | Refills: 0 | Status: SHIPPED | OUTPATIENT
Start: 2018-11-27 | End: 2019-06-11

## 2018-11-27 NOTE — PROGRESS NOTES
Problem(s) Oriented visit        SUBJECTIVE:                                                    Felix Millard is a 68 year old male who presents to clinic today for URI onset a week ago. He has cough that is gagging and wheezing. He denies any fever. He is mildly short of breath. He typically swims for exercise and hasn't been able to since being ill. Cough is productive of cloudy sputum. He has chronic PND that is worsening. He has sore tongue and throat. He quit tobacco in 2000 and has never been treated for RAD.      Problem list, Medication list, Allergies, and Medical/Social/Surgical histories reviewed in [facility] and updated as appropriate.   Additional history: as documented    ROS:  5 point ROS completed and negative except noted above, including Gen, CV, Resp, GI, MS      Histories:   Patient Active Problem List   Diagnosis     ADD (attention deficit disorder)     Seasonal allergies     Diverticulosis     Eczema     Health Care Home     Hx of diverticulitis of colon     Depression     Alcoholism in Sobriety     Major depression in complete remission (H)     Past Surgical History:   Procedure Laterality Date     CHOLECYSTECTOMY, LAPOROSCOPIC  1980's    Cholecystectomy, Laparoscopic     HERNIORRHAPHY VENTRAL N/A 4/28/2016    Procedure: HERNIORRHAPHY VENTRAL;  Surgeon: Igor Mcnair MD;  Location: [facility]     LAPAROSCOPIC APPENDECTOMY N/A 6/18/2015    Procedure: LAPAROSCOPIC APPENDECTOMY;  Surgeon: Igor Mcnair MD;  Location:  OR     LAPAROSCOPIC ASSISTED COLECTOMY LEFT (DESCENDING) Left 6/18/2015    Procedure: LAPAROSCOPIC ASSISTED COLECTOMY LEFT (DESCENDING);  Surgeon: Igor Mcnair MD;  Location:  OR     LAPAROSCOPIC HERNIORRHAPHY INGUINAL BILATERAL Bilateral 4/28/2016    Procedure: LAPAROSCOPIC HERNIORRHAPHY INGUINAL BILATERAL;  Surgeon: Igor Mcnair MD;  Location: [facility]     LAPAROSCOPIC HERNIORRHAPHY VENTRAL N/A 6/18/2015    Procedure: LAPAROSCOPIC HERNIORRHAPHY  VENTRAL;  Surgeon: Igor Mcnair MD;  Location:  OR     LAPAROSCOPIC HERNIORRHAPHY VENTRAL N/A 4/28/2016    Procedure: LAPAROSCOPIC HERNIORRHAPHY VENTRAL;  Surgeon: Igor Mcnair MD;  Location:  [facility]     LAPAROSCOPIC LYSIS ADHESIONS N/A 6/18/2015    Procedure: LAPAROSCOPIC LYSIS ADHESIONS;  Surgeon: Igor Mcnair MD;  Location:  OR     PHACOEMULSIFICATION CLEAR CORNEA WITH STANDARD INTRAOCULAR LENS IMPLANT Right 8/22/2016    Procedure: PHACOEMULSIFICATION CLEAR CORNEA WITH STANDARD INTRAOCULAR LENS IMPLANT;  Surgeon: Jaylan Mills MD;  Location:  [facility]     VARICOCELECTOMY  1950's       Social History   Substance Use Topics     Smoking status: Former Smoker     Types: Cigarettes     Quit date: 11/11/2001     Smokeless tobacco: Never Used     Alcohol use No      Comment: quit oct 1980     Family History   Problem Relation Age of Onset     Cerebrovascular Disease Father      Diabetes Maternal Grandmother            OBJECTIVE:                                                    /78  Pulse 82  Resp 16  Wt 101.2 kg (223 lb)  SpO2 97%  BMI 27.87 kg/m2  Body mass index is 27.87 kg/(m^2).   GENERAL APPEARANCE: Alert, no acute distress  EYES: PERRL, EOM normal, conjunctiva and lids normal  HENT: TMs are translucent, but with clear effusion, oropharynx with thick mucous in the throat  NECK: No adenopathy,masses or thyromegaly  RESP: Increased expiratory phase and expiratory wheeze is appreciated throughout.  CV: normal rate, regular rhythm, no murmur or gallop  MS: extremities normal, no peripheral edema  NEURO: Alert, oriented, speech and mentation normal  PSYCH: mentation appears normal, affect and mood normal   Labs Resulted Today:   Results for orders placed or performed during the hospital encounter of 03/29/18   Exercise Stress test    Narrative    [id number]  [id number]  [id number]^JUAN PABLO^MANSOOR        [facility] [place]  [facility]  Ech[facility]  Laboratory  [address] [place] [address] [address]  [place] [address] [address]  Phone [phone]  Fax [phone]        Name: DAFNE ALFARO  MRN: [id number]  : 1950  Study Date: 2018 03:33 PM  Age: 68 yrs  Gender: Male  Reason For Study: Dyspnea on Exertion  Ordering Physician: Marvel De La Fuente  Referring Physician: Marvel De La Fuente  Performed By: Julissa Ball     BSA: 2.3 m2  Height: 75 in  Weight: 221 lb  HR: 82  BP: 144/82 mmHg  _____________________________________________________________________________  __        Procedure  Treadmill stress test.  _____________________________________________________________________________  __        Interpretation Summary     There was a hypertensive BP response to exercise.  This was a normal stress EKG with no evidence of stress-induced ischemia.  _____________________________________________________________________________  __     Baseline  The patient is in normal sinus rhythm.  Mild diffuse J point elevation without other ST change(probable normal  variant).  The patient exhibited hypertension at rest.  Stress  RPP 31,692.  The patient exhibited no chest pain during exercise.  There was a hypertensive BP response to exercise.  Exercise was stopped due to fatigue.  The patient exercised 7 minutes 30 seconds.  A moderately-high workload was achieved.  This was a normal stress EKG with no evidence of stress-induced ischemia.  The Duke treadmill score was low risk ( >5 Duke score).  A treadmill exercise test according to the Manuel protocol was performed.  Target Heart Rate was achieved.  No arrhythmia noted.  There were no ST segment changes observed with stress.     Stress Results         Protocol:  Manuel Protocol        Maximum Predicted HR:   150 bpm         Target HR: 128 bpm               % Maximum Predicted HR: 93 %        Stage  DurationHeart Rate  BP                   Comment            (mm:ss)   (bpm)    Stage 1   3:00     108    192/86No symptoms  during stress    Stage 2   3:00     131    228/80    Stage 3   1:30     139      /   FAC: ABOVE AVERAGE; DUKE SCORE: 8 LOW RISK    RECOVERY  6:00      92    144/82                        Stress Duration:   7:30 mm:ss *                  Maximum Stress HR: 139 bpm *           METS: 10  _____________________________________________________________________________  __                 Report approved by: Jah Bernard 03/29/2018 04:52 PM     _____________________________________________________________________________  __        ASSESSMENT/PLAN:                                                        Felix was seen today for uri.    Diagnoses and all orders for this visit:    Acute bronchitis, unspecified organism  -     doxycycline hyclate (VIBRAMYCIN) 100 MG capsule; Take 1 capsule (100 mg) by mouth 2 times daily  -     albuterol (PROAIR RESPICLICK) 108 (90 Base) MCG/ACT inhaler; Inhale 1 puff into the lungs every 4 hours as needed for shortness of breath / dyspnea or wheezing  He is sent a link to teaching website for inhaler use. Call if failure to improve.    Acute non-recurrent sinusitis, unspecified location  -     doxycycline hyclate (VIBRAMYCIN) 100 MG capsule; Take 1 capsule (100 mg) by mouth 2 times daily      There are no Patient Instructions on file for this visit.    The following health maintenance items are reviewed in Epic and correct as of today:  Health Maintenance   Topic Date Due     ADVANCE DIRECTIVE PLANNING Q5 YRS  01/09/2003     COLONOSCOPY Q5 YR  07/23/2018     INFLUENZA VACCINE (1) 09/01/2018     PHQ-9 Q6 MONTHS  09/12/2018     FALL RISK ASSESSMENT  03/12/2019     LIPID SCREEN Q5 YR MALE (SYSTEM ASSIGNED)  03/12/2023     TETANUS IMMUNIZATION (SYSTEM ASSIGNED)  12/21/2025     PNEUMOCOCCAL  Completed     DEPRESSION ACTION PLAN  Completed     AORTIC ANEURYSM SCREENING (SYSTEM ASSIGNED)  Completed     HEPATITIS C SCREENING  Completed       Bhakti Lemus MD  [facility]  [facility]  [facility]  [phone]    For any issues my office # is [phone]

## 2018-11-27 NOTE — MR AVS SNAPSHOT
"              After Visit Summary   2018    Felix Millard    MRN: [id number]           Patient Information     Date Of Birth          1950        Visit Information        Provider Department      2018 9:45 AM Bhakti Lemus MD [facility]        Today's Diagnoses     Acute bronchitis, unspecified organism    -  1    Acute non-recurrent sinusitis, unspecified location           Follow-ups after your visit        Who to contact     If you have questions or need follow up information about today's clinic visit or your schedule please contact [facility] directly at [phone].  Normal or non-critical lab and imaging results will be communicated to you by [employer], letter or phone within 4 business days after the clinic has received the results. If you do not hear from us within 7 days, please contact the clinic through [employer] or phone. If you have a critical or abnormal lab result, we will notify you by phone as soon as possible.  Submit refill requests through [employer] or call your pharmacy and they will forward the refill request to us. Please allow 3 business days for your refill to be completed.          Additional Information About Your Visit        MyChart Information     [employer] lets you send messages to your doctor, view your test results, renew your prescriptions, schedule appointments and more. To sign up, go to [email or web address] . Click on \"Log in\" on the left side of the screen, which will take you to the Welcome page. Then click on \"Sign up Now\" on the right side of the page.     You will be asked to enter the access code listed below, as well as some personal information. Please follow the directions to create your username and password.     Your access code is: [email or web address]  Expires: 2019  6:35 PM     Your access code will  in 90 days. If you need help or a new code, please call your [place] clinic or [phone].        Care " EveryWhere ID     This is your Care EveryWhere ID. This could be used by other organizations to access your [facility] medical records  [id number]        Your Vitals Were     Pulse Respirations Pulse Oximetry BMI (Body Mass Index)          82 16 97% 27.87 kg/m2         Blood Pressure from Last 3 Encounters:   11/27/18 132/78   03/12/18 (!) 146/100   06/28/17 130/82    Weight from Last 3 Encounters:   11/27/18 101.2 kg (223 lb)   03/12/18 100.2 kg (221 lb)   06/28/17 98.2 kg (216 lb 9.6 oz)              Today, you had the following     No orders found for display         Today's Medication Changes          These changes are accurate as of 11/27/18  6:35 PM.  If you have any questions, ask your nurse or doctor.               Start taking these medicines.        Dose/Directions    albuterol 108 (90 Base) MCG/ACT inhaler   Commonly known as:  PROAIR RESPICLICK   Used for:  Acute bronchitis, unspecified organism   Started by:  Bhakti Lemus MD        Dose:  1 puff   Inhale 1 puff into the lungs every 4 hours as needed for shortness of breath / dyspnea or wheezing   Quantity:  1 Inhaler   Refills:  1       doxycycline hyclate 100 MG capsule   Commonly known as:  VIBRAMYCIN   Used for:  Acute non-recurrent sinusitis, unspecified location, Acute bronchitis, unspecified organism   Started by:  Bhakti Lemus MD        Dose:  100 mg   Take 1 capsule (100 mg) by mouth 2 times daily   Quantity:  20 capsule   Refills:  0            Where to get your medicines      These medications were sent to [id number] - [place], [place] - [address] [address] [facility]  [address] [facility] [address]     Phone:  [phone]     albuterol 108 (90 Base) MCG/ACT inhaler    doxycycline hyclate 100 MG capsule                Primary Care Provider Office Phone # Fax #    Marvel De La Fuente [phone] [address]  [facility] [address]        Equal Access to Services     SONIA AVILES  AH: Hadii brandi pavonwaleskao Sodionali, waaxda luqadaha, qaybta kaalgregorio mclaughlin, alma crystalin hayaagloria starkeyinessa barker andrzej vicente. So [facility] [phone].    ATENCIÓN: Si habla español, tiene a brunner disposición servicios gratuitos de asistencia lingüística. Llame al [phone].    We comply with applicable federal civil rights laws and [place] laws. We do not discriminate on the basis of race, color, national origin, age, disability, sex, sexual orientation, or gender identity.            Thank you!     Thank you for choosing [facility]  for your care. Our goal is always to provide you with excellent care. Hearing back from our patients is one way we can continue to improve our services. Please take a few minutes to complete the written survey that you may receive in the mail after your visit with us. Thank you!             Your Updated Medication List - Protect others around you: Learn how to safely use, store and throw away your medicines at www.disposemymeds.org.          This list is accurate as of 11/27/18  6:35 PM.  Always use your most recent med list.                   Brand Name Dispense Instructions for use Diagnosis    albuterol 108 (90 Base) MCG/ACT inhaler    PROAIR RESPICLICK    1 Inhaler    Inhale 1 puff into the lungs every 4 hours as needed for shortness of breath / dyspnea or wheezing    Acute bronchitis, unspecified organism       CENTRUM PO      Take 1 tablet by mouth daily Reported on 2/22/2017        doxycycline hyclate 100 MG capsule    VIBRAMYCIN    20 capsule    Take 1 capsule (100 mg) by mouth 2 times daily    Acute non-recurrent sinusitis, unspecified location, Acute bronchitis, unspecified organism       EXCEDRIN EXTRA STRENGTH PO      Take 1 tablet by mouth 2 times daily as needed    Encounter for medication refill       RITALIN PO      Take 20 mg by mouth 2 times daily Reported on 2/22/2017        VIIBRYD 20 MG Tabs tablet   Generic drug:  vilazodone      Take by mouth At Bedtime         VITAMIN D (CHOLECALCIFEROL) PO      Take 1,000 Units by mouth daily        WELLBUTRIN  MG 12 hr tablet   Generic drug:  buPROPion      Take 150 mg by mouth daily.

## 2018-12-11 ENCOUNTER — OFFICE VISIT (OUTPATIENT)
Dept: FAMILY MEDICINE | Facility: CLINIC | Age: 68
End: 2018-12-11

## 2018-12-11 VITALS
RESPIRATION RATE: 20 BRPM | TEMPERATURE: 98.5 F | SYSTOLIC BLOOD PRESSURE: 144 MMHG | DIASTOLIC BLOOD PRESSURE: 94 MMHG | BODY MASS INDEX: 26.42 KG/M2 | WEIGHT: 211.4 LBS | HEART RATE: 96 BPM

## 2018-12-11 DIAGNOSIS — R09.82 PND (POST-NASAL DRIP): Primary | ICD-10-CM

## 2018-12-11 PROCEDURE — 99214 OFFICE O/P EST MOD 30 MIN: CPT | Performed by: NURSE PRACTITIONER

## 2018-12-11 ASSESSMENT — PAIN SCALES - GENERAL: PAINLEVEL: MODERATE PAIN (4)

## 2018-12-11 NOTE — PATIENT INSTRUCTIONS
Post Nasal Drip:         Use flonase 2 sprays in each nostril daily x 1 month, can try sinus irrigation     Try omeprazole 20 mg each morning before breakfast x 1 month    ENT referral

## 2018-12-11 NOTE — PROGRESS NOTES
SUBJECTIVE:   Felix Millard is a 68 year old male who presents to clinic today for the following health issues: Was here 2 weeks ago for same issue, finished 10 days of antibiotic last Thursday.  [place] some better, lungs better, but now with sore throat, ear pain, gagging, sores in mouth, lips numb, PND. Can't eat without pain. Vague in pain description.   Finished Vibramycin on 12/6/18  No Excedrin yet today      RESPIRATORY SYMPTOMS    Saw Dr Lemus on 11/27/18      Duration: 3wks    Description    Sores in mouth    wretching nausea  sore throat, cough, ear pain bilateral, fatigue/malaise, hoarse voice,dry heaves from gaging on mucous, conjunctival irritation and hard to swallow- too much mucous  Drooling at night  Coated tongue    Severity: moderate    Accompanying signs and symptoms: see above    History (predisposing factors):  none    Precipitating or alleviating factors: finished AB on 12/6/18 from [place]- less cough    Therapies tried and outcome:  rest and fluids acetaminophen BID, gargling with [place]- some help          Problem list and histories reviewed & adjusted, as indicated.  Additional history: as documented    Labs reviewed in EPIC    Reviewed and updated as needed this visit by clinical staff       Reviewed and updated as needed this visit by Provider         ROS:  Constitutional, HEENT, cardiovascular, pulmonary, gi and gu systems are negative, except as otherwise noted.    OBJECTIVE:     BP (!) 144/94   Pulse 96   Temp 98.5  F (36.9  C) (Oral)   Resp 20   Wt 95.9 kg (211 lb 6.4 oz)   BMI 26.42 kg/m    Body mass index is 26.42 kg/m .  GENERAL: healthy, alert and no distress  EYES: Eyes grossly normal to inspection, PERRL and conjunctivae and sclerae normal  HENT: ear canals and TM's normal, nose and mouth without ulcers or lesions  HENT: normal cephalic/atraumatic, ear canals and TM's normal, nose and mouth without ulcers or lesions, oral mucous membranes moist and clear mucus in back of  throat.  NECK: no adenopathy, no asymmetry, masses, or scars and thyroid normal to palpation  RESP: lungs clear to auscultation - no rales, rhonchi or wheezes  CV: regular rate and rhythm, normal S1 S2, no S3 or S4, no murmur, click or rub, no peripheral edema and peripheral pulses strong  SKIN: no suspicious lesions or rashes  PSYCH: mentation appears normal and anxious    Diagnostic Test Results:  none     ASSESSMENT/PLAN:       1. PND (post-nasal drip)   flonase daily x 1 month, sinus rinse  Try omeprazole 20 mg daily in am before breakfast x 1 month  - OTOLARYNGOLOGY REFERRAL    FUTURE APPOINTMENTS:       - Follow-up for annual visit or as needed  See Patient Instructions    JAY Rubio CNP  [facility]

## 2018-12-14 ENCOUNTER — TRANSFERRED RECORDS (OUTPATIENT)
Dept: FAMILY MEDICINE | Facility: CLINIC | Age: 68
End: 2018-12-14

## 2018-12-27 ENCOUNTER — TRANSFERRED RECORDS (OUTPATIENT)
Dept: FAMILY MEDICINE | Facility: CLINIC | Age: 68
End: 2018-12-27

## 2019-05-09 ENCOUNTER — TRANSFERRED RECORDS (OUTPATIENT)
Dept: FAMILY MEDICINE | Facility: CLINIC | Age: 69
End: 2019-05-09

## 2019-06-11 ENCOUNTER — OFFICE VISIT (OUTPATIENT)
Dept: FAMILY MEDICINE | Facility: CLINIC | Age: 69
End: 2019-06-11

## 2019-06-11 VITALS
SYSTOLIC BLOOD PRESSURE: 126 MMHG | RESPIRATION RATE: 20 BRPM | DIASTOLIC BLOOD PRESSURE: 78 MMHG | BODY MASS INDEX: 27.35 KG/M2 | TEMPERATURE: 98.6 F | HEIGHT: 75 IN | HEART RATE: 70 BPM | OXYGEN SATURATION: 97 % | WEIGHT: 220 LBS

## 2019-06-11 DIAGNOSIS — Z01.818 PREOP GENERAL PHYSICAL EXAM: Primary | ICD-10-CM

## 2019-06-11 DIAGNOSIS — H25.9 SENILE CATARACT OF LEFT EYE, UNSPECIFIED AGE-RELATED CATARACT TYPE: ICD-10-CM

## 2019-06-11 DIAGNOSIS — F32.5 MAJOR DEPRESSION IN COMPLETE REMISSION (H): ICD-10-CM

## 2019-06-11 PROCEDURE — 99213 OFFICE O/P EST LOW 20 MIN: CPT | Performed by: FAMILY MEDICINE

## 2019-06-11 RX ORDER — ESCITALOPRAM OXALATE 20 MG/1
20 TABLET ORAL
COMMUNITY
Start: 2016-10-05

## 2019-06-11 ASSESSMENT — PATIENT HEALTH QUESTIONNAIRE - PHQ9: SUM OF ALL RESPONSES TO PHQ QUESTIONS 1-9: 0

## 2019-06-11 ASSESSMENT — MIFFLIN-ST. JEOR: SCORE: 1838.54

## 2019-06-11 NOTE — PROGRESS NOTES
[facility]  [address] [place] [id number] [phone]  Dept: [phone]  PRE-OP EVALUATION:  Today's date: 2019    Felix Millard (: 1950) presents for pre-operative evaluation assessment as requested by Dr. Jaylan Mills.  He requires evaluation and anesthesia risk assessment prior to undergoing surgery/procedure for treatment of Cataract surgery for left eye .    Proposed Surgery/ Procedure: Left eye -Cataract Surgery  Date of Surgery/ Procedure: 2019  Time of Surgery/ Procedure: [facility]/Surgical Facility: [facility]   Fax number for surgical facility: [phone]  Primary Physician: Marvel De La Fuente  Type of Anesthesia Anticipated: to be determined    Patient has a Health Care Directive or Living Will:  YES Unsure if it's on file    1. NO - Do you have a history of heart attack, stroke, stent, bypass or surgery on an artery in the head, neck, heart or legs?  2. NO - Do you ever have any pain or discomfort in your chest?  3. NO - Do you have a history of  Heart Failure?  4. NO - Are you troubled by shortness of breath when: walking on the level, up a slight hill or at night?  5. NO - Do you currently have a cold, bronchitis or other respiratory infection?  6. NO - Do you have a cough, shortness of breath or wheezing?  7. NO - Do you sometimes get pains in the calves of your legs when you walk?  8. NO - Do you or anyone in your family have previous history of blood clots?  9. NO - Do you or does anyone in your family have a serious bleeding problem such as prolonged bleeding following surgeries or cuts?  10. NO - Have you ever had problems with anemia or been told to take iron pills?  11. NO - Have you had any abnormal blood loss such as black, tarry or bloody stools, or abnormal vaginal bleeding?  12. NO - Have you ever had a blood transfusion?  13. NO - Have you or any of your relatives ever had problems with anesthesia?  14. NO - Do  you have sleep apnea, excessive snoring or daytime drowsiness?  15. NO - Do you have any prosthetic heart valves?  16. NO - Do you have prosthetic joints?  17. NO - Is there any chance that you may be pregnant?      HPI:     HPI related to upcoming procedure: L eye cataract, w/p R eye repair several years ago      See problem list for active medical problems.  Problems all longstanding and stable, except as noted/documented.  See ROS for pertinent symptoms related to these conditions.      MEDICAL HISTORY:     Patient Active Problem List    Diagnosis Date Noted     Alcoholism in Sobriety 08/15/2016     Priority: Medium     Major depression in complete remission (H) 08/15/2016     Priority: Medium     Depression 12/21/2015     Priority: Medium     Hx of diverticulitis of colon 06/18/2015     Priority: Medium     Health Care Home 08/01/2013     Priority: Medium     State Tier level 0  8/21/14       Diverticulosis      Priority: Medium     Eczema      Priority: Medium     ADD (attention deficit disorder)      Priority: Medium     Seasonal allergies      Priority: Medium      Past Medical History:   Diagnosis Date     ADD (attention deficit disorder)      Depressive disorder, not elsewhere classified      Diverticulosis      Eczema      History of shingles      Hyperlipidaemia LDL goal < 100      Major depression in complete remission (H) 8/15/2016     Seasonal allergies      Tinnitus      Past Surgical History:   Procedure Laterality Date     CHOLECYSTECTOMY, LAPOROSCOPIC  1980's    Cholecystectomy, Laparoscopic     HERNIORRHAPHY VENTRAL N/A 4/28/2016    Procedure: HERNIORRHAPHY VENTRAL;  Surgeon: Igor Mcnair MD;  Location:  [facility]     LAPAROSCOPIC APPENDECTOMY N/A 6/18/2015    Procedure: LAPAROSCOPIC APPENDECTOMY;  Surgeon: Igor Mcnair MD;  Location:  OR     LAPAROSCOPIC ASSISTED COLECTOMY LEFT (DESCENDING) Left 6/18/2015    Procedure: LAPAROSCOPIC ASSISTED COLECTOMY LEFT (DESCENDING);  Surgeon:  Igor Mcnair MD;  Location:  OR     LAPAROSCOPIC HERNIORRHAPHY INGUINAL BILATERAL Bilateral 4/28/2016    Procedure: LAPAROSCOPIC HERNIORRHAPHY INGUINAL BILATERAL;  Surgeon: Igor Mcnair MD;  Location:  [facility]     LAPAROSCOPIC HERNIORRHAPHY VENTRAL N/A 6/18/2015    Procedure: LAPAROSCOPIC HERNIORRHAPHY VENTRAL;  Surgeon: Igor Mcnair MD;  Location:  OR     LAPAROSCOPIC HERNIORRHAPHY VENTRAL N/A 4/28/2016    Procedure: LAPAROSCOPIC HERNIORRHAPHY VENTRAL;  Surgeon: Igor Mcnair MD;  Location:  [facility]     LAPAROSCOPIC LYSIS ADHESIONS N/A 6/18/2015    Procedure: LAPAROSCOPIC LYSIS ADHESIONS;  Surgeon: Igor Mcnair MD;  Location:  OR     PHACOEMULSIFICATION CLEAR CORNEA WITH STANDARD INTRAOCULAR LENS IMPLANT Right 8/22/2016    Procedure: PHACOEMULSIFICATION CLEAR CORNEA WITH STANDARD INTRAOCULAR LENS IMPLANT;  Surgeon: Jaylan Mills MD;  Location:  [facility]     VARICOCELECTOMY  1950's     Current Outpatient Medications   Medication Sig Dispense Refill     albuterol (PROAIR RESPICLICK) 108 (90 Base) MCG/ACT inhaler Inhale 1 puff into the lungs every 4 hours as needed for shortness of breath / dyspnea or wheezing 1 Inhaler 1     Aspirin-Acetaminophen-Caffeine (EXCEDRIN EXTRA STRENGTH PO) Take 1 tablet by mouth 2 times daily as needed        buPROPion (WELLBUTRIN SR) 150 MG 12 hr tablet Take 150 mg by mouth daily.       doxycycline hyclate (VIBRAMYCIN) 100 MG capsule Take 1 capsule (100 mg) by mouth 2 times daily 20 capsule 0     Methylphenidate HCl (RITALIN PO) Take 20 mg by mouth 2 times daily Reported on 2/22/2017       Multiple Vitamins-Minerals (CENTRUM PO) Take 1 tablet by mouth daily Reported on 2/22/2017       vilazodone (VIIBRYD) 20 MG TABS tablet Take by mouth At Bedtime       VITAMIN D, CHOLECALCIFEROL, PO Take 1,000 Units by mouth daily       OTC products: None, except as noted above    Allergies   Allergen Reactions     Augmentin Hives     Ciprofloxacin Hives  "    Wheat Bran Nausea     Oxycodone Anxiety      Latex Allergy: NO    Social History     Tobacco Use     Smoking status: Former Smoker     Types: Cigarettes     Last attempt to quit: 2001     Years since quittin.5     Smokeless tobacco: Never Used   Substance Use Topics     Alcohol use: No     Alcohol/week: 0.0 oz     Comment: quit oct 1980     History   Drug Use No       REVIEW OF SYSTEMS:   CONSTITUTIONAL: NEGATIVE for fever, chills, change in weight  ENT/MOUTH: NEGATIVE for ear, mouth and throat problems  RESP: NEGATIVE for significant cough or SOB  CV: NEGATIVE for chest pain, palpitations or peripheral edema    EXAM:   /78   Pulse 70   Temp 98.6  F (37  C) (Temporal)   Resp 20   Ht 1.905 m (6' 3\")   Wt 99.8 kg (220 lb)   SpO2 97%   BMI 27.50 kg/m    GENERAL APPEARANCE: healthy, alert and no distress  HENT: ear canals and TM's normal and nose and mouth without ulcers or lesions  RESP: lungs clear to auscultation - no rales, rhonchi or wheezes  CV: regular rate and rhythm, normal S1 S2, no S3 or S4 and no murmur, click or rub   ABDOMEN: soft, nontender, no HSM or masses and bowel sounds normal  NEURO: Normal strength and tone, sensory exam grossly normal, mentation intact and speech normal    DIAGNOSTICS:   No labs or EKG required for low risk surgery (cataract, skin procedure, breast biopsy, etc)    Recent Labs   Lab Test 18  1043 16  1357 12/22/15  0921 11/23/15  1111   HGB  --  14.9  --  15.1   PLT  --  272  --  266     --  142  --    POTASSIUM 4.6  --  4.9  --    CR 1.34*  --  1.07  --         IMPRESSION:   Reason for surgery/procedure: L eye cataract  Diagnosis/reason for consult: preoperative clearance    The proposed surgical procedure is considered LOW risk.    REVISED CARDIAC RISK INDEX  The patient has the following serious cardiovascular risks for perioperative complications such as (MI, PE, VFib and 3  AV Block):  No serious cardiac risks  INTERPRETATION: 0 " risks: Class I (very low risk - 0.4% complication rate)    The patient has the following additional risks for perioperative complications:  No identified additional risks      ICD-10-CM    1. Preop general physical exam Z01.818    2. Major depression in complete remission (H) F32.5    3. Senile cataract of left eye, unspecified age-related cataract type H25.9        RECOMMENDATIONS:     --Patient is to take all scheduled medications on the day of surgery EXCEPT for modifications listed below.    APPROVAL GIVEN to proceed with proposed procedure, without further diagnostic evaluation     Signed Electronically by: Morris Bueno MD    Copy of this evaluation report is provided to requesting physician.    [place] Preop Guidelines    Revised Cardiac Risk Index

## 2019-06-13 NOTE — PROGRESS NOTES
6/12/19 faxed preop to [facility] surgery #[phone].    Dequan De Paz,   [facility]  [phone]

## 2019-06-21 NOTE — PROGRESS NOTES
6/20/19 faxed preop to [facility] @ [phone]    Dequan De Paz,   [facility]  [phone]

## 2019-10-16 ENCOUNTER — TRANSFERRED RECORDS (OUTPATIENT)
Dept: FAMILY MEDICINE | Facility: CLINIC | Age: 69
End: 2019-10-16

## 2019-10-24 ENCOUNTER — TRANSFERRED RECORDS (OUTPATIENT)
Dept: FAMILY MEDICINE | Facility: CLINIC | Age: 69
End: 2019-10-24

## 2019-11-01 ENCOUNTER — TRANSFERRED RECORDS (OUTPATIENT)
Dept: FAMILY MEDICINE | Facility: CLINIC | Age: 69
End: 2019-11-01

## 2020-07-31 ENCOUNTER — TRANSFERRED RECORDS (OUTPATIENT)
Dept: FAMILY MEDICINE | Facility: CLINIC | Age: 70
End: 2020-07-31

## 2020-12-04 ENCOUNTER — TELEPHONE (OUTPATIENT)
Dept: FAMILY MEDICINE | Facility: CLINIC | Age: 70
End: 2020-12-04

## 2020-12-04 DIAGNOSIS — R11.10 RETCHING: ICD-10-CM

## 2020-12-04 DIAGNOSIS — R11.0 NAUSEA: Primary | ICD-10-CM

## 2020-12-04 RX ORDER — ONDANSETRON 4 MG/1
4 TABLET, ORALLY DISINTEGRATING ORAL EVERY 6 HOURS PRN
Qty: 20 TABLET | Refills: 0 | Status: SHIPPED | OUTPATIENT
Start: 2020-12-04 | End: 2021-10-06

## 2020-12-04 NOTE — TELEPHONE ENCOUNTER
Patient called clinic requesting medication to treat COVID symptoms. Patient reports symptoms began 11/28/20, was tested 11/30/20 and received results 12/2/20. He reports dry heaves, no nausea or vomiting. He is able to keep food and fluids down without emesis. Discussed with Dr Dexter-on call MD who offered PRN Zofran which may help. Patient would like this prescription-sent to pharmacy. Patient reminded that it has been 18 months since last visit, asked to schedule office visit when recovered. Saida Bateman

## 2021-02-08 ENCOUNTER — OFFICE VISIT (OUTPATIENT)
Dept: FAMILY MEDICINE | Facility: CLINIC | Age: 71
End: 2021-02-08

## 2021-02-08 VITALS
BODY MASS INDEX: 25.16 KG/M2 | HEIGHT: 76 IN | RESPIRATION RATE: 16 BRPM | WEIGHT: 206.6 LBS | HEART RATE: 84 BPM | SYSTOLIC BLOOD PRESSURE: 124 MMHG | DIASTOLIC BLOOD PRESSURE: 86 MMHG | TEMPERATURE: 97.4 F | OXYGEN SATURATION: 97 %

## 2021-02-08 DIAGNOSIS — F10.21 ALCOHOL DEPENDENCE IN REMISSION (H): ICD-10-CM

## 2021-02-08 DIAGNOSIS — Z13.6 SCREENING FOR HEART DISEASE: ICD-10-CM

## 2021-02-08 DIAGNOSIS — L30.9 ECZEMA, UNSPECIFIED TYPE: Primary | ICD-10-CM

## 2021-02-08 DIAGNOSIS — F32.5 MAJOR DEPRESSION IN COMPLETE REMISSION (H): ICD-10-CM

## 2021-02-08 DIAGNOSIS — Z12.5 SCREENING FOR PROSTATE CANCER: ICD-10-CM

## 2021-02-08 DIAGNOSIS — R10.13 ABDOMINAL PAIN, EPIGASTRIC: ICD-10-CM

## 2021-02-08 LAB
% GRANULOCYTES: 56.8 % (ref 42.2–75.2)
HCT VFR BLD AUTO: 48.1 % (ref 39–51)
HEMOGLOBIN: 15.2 G/DL (ref 13.4–17.5)
LYMPHOCYTES NFR BLD AUTO: 34.4 % (ref 20.5–51.1)
MCH RBC QN AUTO: 30.4 PG (ref 27–31)
MCHC RBC AUTO-ENTMCNC: 31.7 G/DL (ref 33–37)
MCV RBC AUTO: 95.7 FL (ref 80–100)
MONOCYTES NFR BLD AUTO: 8.8 % (ref 1.7–9.3)
PLATELET # BLD AUTO: 337 K/UL (ref 140–450)
RBC # BLD AUTO: 5.02 X10/CMM (ref 4.2–5.9)
WBC # BLD AUTO: 10 X10/CMM (ref 3.8–11)

## 2021-02-08 PROCEDURE — 99214 OFFICE O/P EST MOD 30 MIN: CPT | Performed by: FAMILY MEDICINE

## 2021-02-08 PROCEDURE — 36415 COLL VENOUS BLD VENIPUNCTURE: CPT | Performed by: FAMILY MEDICINE

## 2021-02-08 PROCEDURE — 85025 COMPLETE CBC W/AUTO DIFF WBC: CPT | Performed by: FAMILY MEDICINE

## 2021-02-08 RX ORDER — CELECOXIB 100 MG/1
CAPSULE ORAL PRN
COMMUNITY
Start: 2020-08-27 | End: 2021-10-06

## 2021-02-08 ASSESSMENT — MIFFLIN-ST. JEOR: SCORE: 1783.63

## 2021-02-08 ASSESSMENT — ANXIETY QUESTIONNAIRES
6. BECOMING EASILY ANNOYED OR IRRITABLE: MORE THAN HALF THE DAYS
GAD7 TOTAL SCORE: 8
IF YOU CHECKED OFF ANY PROBLEMS ON THIS QUESTIONNAIRE, HOW DIFFICULT HAVE THESE PROBLEMS MADE IT FOR YOU TO DO YOUR WORK, TAKE CARE OF THINGS AT HOME, OR GET ALONG WITH OTHER PEOPLE: SOMEWHAT DIFFICULT
5. BEING SO RESTLESS THAT IT IS HARD TO SIT STILL: NOT AT ALL
3. WORRYING TOO MUCH ABOUT DIFFERENT THINGS: MORE THAN HALF THE DAYS
1. FEELING NERVOUS, ANXIOUS, OR ON EDGE: SEVERAL DAYS
7. FEELING AFRAID AS IF SOMETHING AWFUL MIGHT HAPPEN: NOT AT ALL
2. NOT BEING ABLE TO STOP OR CONTROL WORRYING: MORE THAN HALF THE DAYS

## 2021-02-08 ASSESSMENT — PATIENT HEALTH QUESTIONNAIRE - PHQ9
SUM OF ALL RESPONSES TO PHQ QUESTIONS 1-9: 0
5. POOR APPETITE OR OVEREATING: SEVERAL DAYS

## 2021-02-08 ASSESSMENT — PAIN SCALES - GENERAL: PAINLEVEL: MILD PAIN (3)

## 2021-02-08 NOTE — LETTER
[facility] [address] [place]  [address]  Phone: [phone]    February 9, 2021      Felix Millard  [address]   [place] [address]          Dear Felix,       I am writing to report that your included test results are within expected ranges. I do not suggest that we make any changes at this time.       Marvel De La Fuente M.D.       Results for orders placed or performed in visit on 02/08/21   Comp. Metabolic Panel (14) ([facility])     Status: None   Result Value Ref Range    Glucose 87 65 - 99 mg/dL    Urea Nitrogen 23 8 - 27 mg/dL    Creatinine 1.08 0.76 - 1.27 mg/dL    eGFR If NonAfricn Am 68 >59 mL/min/1.73    eGFR If Africn Am 78 >59 mL/min/1.73    BUN/Creatinine Ratio 21 10 - 24    Sodium 139 134 - 144 mmol/L    Potassium 4.8 3.5 - 5.2 mmol/L    Chloride 104 96 - 106 mmol/L    Total CO2 21 20 - 29 mmol/L    Calcium 9.4 8.6 - 10.2 mg/dL    Protein Total 7.4 6.0 - 8.5 g/dL    Albumin 4.4 3.7 - 4.7 g/dL    Globulin, Total 3.0 1.5 - 4.5 g/dL    A/G Ratio 1.5 1.2 - 2.2    Bilirubin Total 0.4 0.0 - 1.2 mg/dL    Alkaline Phosphatase 79 39 - 117 IU/L    AST 18 0 - 40 IU/L    ALT 15 0 - 44 IU/L    Narrative    Performed at:  01 - [facility] [address], [place], [place]  [id number]  : Saad Melvin MD, Phone:  [phone]   CBC with Diff/Plt ([facility])     Status: Abnormal   Result Value Ref Range    WBC x10/cmm 10.0 3.8 - 11.0 x10/cmm    % Lymphocytes 34.4 20.5 - 51.1 %    % Monocytes 8.8 1.7 - 9.3 %    % Granulocytes 56.8 42.2 - 75.2 %    RBC x10/cmm 5.02 4.2 - 5.9 x10/cmm    Hemoglobin 15.2 13.4 - 17.5 g/dl    Hematocrit 48.1 39 - 51 %    MCV 95.7 80 - 100 fL    MCH 30.4 27.0 - 31.0 pg    MCHC 31.7 (A) 33.0 - 37.0 g/dL    Platelet Count 337 140 - 450 K/uL   PSA Serum ([facility])     Status: None   Result Value Ref Range    PSA NG/ML 1.2 0.0 - 4.0 ng/mL    Narrative    Performed at:  01 - [facility] [address], [place], [place]  [id number]  Lab  Director: Saad Melvin MD, Phone:  [phone]   TSH ([facility])     Status: None   Result Value Ref Range    TSH 1.890 0.450 - 4.500 uIU/mL    Narrative    Performed at:  01 - [facility] [address], [place], [place]  [id number]  : Saad Melvin MD, Phone:  [phone]

## 2021-02-08 NOTE — PROGRESS NOTES
Problem(s) Oriented visit        SUBJECTIVE:                                                    Felix Millard is a 71 year old male who presents to clinic today for the following health issues :    1. Eczema, unspecified type    2. Screening for heart disease      3. Screening for prostate cancer    - PSA Serum ([facility])    4. Abdominal pain, epigastric  abd pain for two weeks,   Had covid in November  - Comp. Metabolic Panel (14) ([facility])  - CBC with Diff/Plt ([facility])  - TSH ([facility])    5. sobriety for 40 years.  Sobriety continues since 1980  Substance of choice was Alcohol.      6. Major depression in complete remission (H)  Depression:  Has known history of depression.  Has been on medication for this.  The patient does not report any significant side effects of this medication.  The prior symptoms leading to the original diagnosis and decision to start medication therapy are better.     Appetite is stable.  Sleeping patterns are stable.  No reported thoughts of suicide or homicide.    Last 3 PHQ9 results:  PHQ-9 SCORE 8/15/2016 3/12/2018 6/11/2019 2/8/2021   PHQ-9 Total Score - - - -   PHQ-9 Total Score 2 4 0 0            Problem list, Medication list, Allergies, and Medical/Social/Surgical histories reviewed in [facility] and updated as appropriate.   Additional history: as documented    ROS:  General and CV completed and negative except as noted above    Histories:   Patient Active Problem List   Diagnosis     ADD (attention deficit disorder)     Seasonal allergies     Diverticulosis     Eczema     Health Care Home     Hx of diverticulitis of colon     Depression     Alcoholism in Sobriety     Major depression in complete remission (H)     Past Surgical History:   Procedure Laterality Date     C BOWEL TO BOWEL ANASTOMOSIS       CHOLECYSTECTOMY, LAPOROSCOPIC  1980's    Cholecystectomy, Laparoscopic     HERNIORRHAPHY VENTRAL N/A 4/28/2016    Procedure: HERNIORRHAPHY VENTRAL;  Surgeon: Igor Mcnair MD;   "Location:  [facility]     LAPAROSCOPIC APPENDECTOMY N/A 2015    Procedure: LAPAROSCOPIC APPENDECTOMY;  Surgeon: Igor Mcnair MD;  Location:  OR     LAPAROSCOPIC ASSISTED COLECTOMY LEFT (DESCENDING) Left 2015    Procedure: LAPAROSCOPIC ASSISTED COLECTOMY LEFT (DESCENDING);  Surgeon: Igor Mcnair MD;  Location:  OR     LAPAROSCOPIC HERNIORRHAPHY INGUINAL BILATERAL Bilateral 2016    Procedure: LAPAROSCOPIC HERNIORRHAPHY INGUINAL BILATERAL;  Surgeon: Igor Mcnair MD;  Location:  [facility]     LAPAROSCOPIC HERNIORRHAPHY VENTRAL N/A 2015    Procedure: LAPAROSCOPIC HERNIORRHAPHY VENTRAL;  Surgeon: Igor Mcnair MD;  Location:  OR     LAPAROSCOPIC HERNIORRHAPHY VENTRAL N/A 2016    Procedure: LAPAROSCOPIC HERNIORRHAPHY VENTRAL;  Surgeon: Igor Mcnair MD;  Location:  [facility]     LAPAROSCOPIC LYSIS ADHESIONS N/A 2015    Procedure: LAPAROSCOPIC LYSIS ADHESIONS;  Surgeon: Igor Mcnair MD;  Location:  OR     PHACOEMULSIFICATION CLEAR CORNEA WITH STANDARD INTRAOCULAR LENS IMPLANT Right 2016    Procedure: PHACOEMULSIFICATION CLEAR CORNEA WITH STANDARD INTRAOCULAR LENS IMPLANT;  Surgeon: Jaylan Mills MD;  Location:  [facility]     VARICOCELECTOMY         Social History     Tobacco Use     Smoking status: Former Smoker     Types: Cigarettes     Quit date: 2001     Years since quittin.2     Smokeless tobacco: Never Used   Substance Use Topics     Alcohol use: No     Alcohol/week: 0.0 standard drinks     Comment: quit oct 1980     Family History   Problem Relation Age of Onset     Cerebrovascular Disease Father      Diabetes Maternal Grandmother            OBJECTIVE:                                                    /86   Pulse 84   Temp 97.4  F (36.3  C) (Temporal)   Resp 16   Ht 1.93 m (6' 4\")   Wt 93.7 kg (206 lb 9.6 oz)   SpO2 97%   BMI 25.15 kg/m    Body mass index is 25.15 kg/m .   APPEARANCE: = Relaxed and in no " distress  Conj/Eyelids = noninjected and lids and lashes are without inflammation  PERRLA/Irises = Pupils Round Reactive to Light and Irisis without inflammation  Neck = No anterior or posterior adenopathy appreciated.  Thyroid = Not enlarged and no masses felt  Resp effort = Calm regular breathing  Breath Sounds = Good air movement with no rales or rhonchi in any lung fields  Heart Rate, Rhythm, & sounds (no Murm)  = Regular rate and rhythm with no S3, S4, or murmur appreciated.  Carotid Art's = Pulses full and equal and no bruits appreciated  Abdomen = Soft, nontender, no masses, & bowel sounds in all quadrants  Liver/Spleen = Normal span and no splenomegaly noted  Digits and Nails = FROM in all finger joints, no nail dystrophy  Ext (edema) = No pretibial edema noted or elsewhere  Musculsktl =  Strength and ROM of major joints are within normal limits  SKIN = absent significant rashes or lesions   Recent/Remote Memory = Alert and Oriented x 3  Mood/Affect = Cooperative and interested     ASSESSMENT/PLAN:                                                        Felix was seen today for derm problem.    Diagnoses and all orders for this visit:    Eczema, unspecified type    Screening for heart disease    Screening for prostate cancer  -     PSA Serum ([facility])    Abdominal pain, epigastric  -     Comp. Metabolic Panel (14) ([facility])  -     CBC with Diff/Plt ([facility])  -     TSH ([facility])    sobriety for 40 years.    Major depression in complete remission (H)        >25 min spent with patient, greater than 50% spent on discussion/education/planning, etc. About The primary encounter diagnosis was Eczema, unspecified type. Diagnoses of Screening for heart disease, Screening for prostate cancer, Abdominal pain, epigastric, sobriety for 40 years., and Major depression in complete remission (H) were also pertinent to this visit.      Patient Instructions     CeraVe Is the best moisturizer I have found, while it is expensive,  it works well.   Apply after bathing.          The following health maintenance items are reviewed in Epic and correct as of today:  Health Maintenance   Topic Date Due     ADVANCE CARE PLANNING  1950     ZOSTER IMMUNIZATION (2 of 3) 12/18/2009     COLORECTAL CANCER SCREENING  07/23/2018     MEDICARE ANNUAL WELLNESS VISIT  03/12/2019     FALL RISK ASSESSMENT  03/12/2019     INFLUENZA VACCINE (1) 09/01/2020     PHQ-9  08/08/2021     LIPID  03/12/2023     DTAP/TDAP/TD IMMUNIZATION (3 - Td) 12/21/2025     HEPATITIS C SCREENING  Completed     DEPRESSION ACTION PLAN  Completed     Pneumococcal Vaccine: 65+ Years  Completed     AORTIC ANEURYSM SCREENING (SYSTEM ASSIGNED)  Completed     Pneumococcal Vaccine: Pediatrics (0 to 5 Years) and At-Risk Patients (6 to 64 Years)  Aged Out     IPV IMMUNIZATION  Aged Out     MENINGITIS IMMUNIZATION  Aged Out     HEPATITIS B IMMUNIZATION  Aged Out       Marvel De La Fuente MD  [facility]  Family Practice  [facility]  [phone]    For any issues my office # is [phone]

## 2021-02-08 NOTE — PATIENT INSTRUCTIONS
CeraVe Is the best moisturizer I have found, while it is expensive, it works well.   Apply after bathing.

## 2021-02-09 LAB
ALBUMIN SERPL-MCNC: 4.4 G/DL (ref 3.7–4.7)
ALBUMIN/GLOB SERPL: 1.5 {RATIO} (ref 1.2–2.2)
ALP SERPL-CCNC: 79 IU/L (ref 39–117)
ALT SERPL-CCNC: 15 IU/L (ref 0–44)
AST SERPL-CCNC: 18 IU/L (ref 0–40)
BILIRUB SERPL-MCNC: 0.4 MG/DL (ref 0–1.2)
BUN SERPL-MCNC: 23 MG/DL (ref 8–27)
BUN/CREATININE RATIO: 21 (ref 10–24)
CALCIUM SERPL-MCNC: 9.4 MG/DL (ref 8.6–10.2)
CHLORIDE SERPLBLD-SCNC: 104 MMOL/L (ref 96–106)
CREAT SERPL-MCNC: 1.08 MG/DL (ref 0.76–1.27)
EGFR IF AFRICN AM: 78 ML/MIN/1.73
EGFR IF NONAFRICN AM: 68 ML/MIN/1.73
GLOBULIN, TOTAL: 3 G/DL (ref 1.5–4.5)
GLUCOSE SERPL-MCNC: 87 MG/DL (ref 65–99)
POTASSIUM SERPL-SCNC: 4.8 MMOL/L (ref 3.5–5.2)
PROT SERPL-MCNC: 7.4 G/DL (ref 6–8.5)
PSA NG/ML: 1.2 NG/ML (ref 0–4)
SODIUM SERPL-SCNC: 139 MMOL/L (ref 134–144)
TOTAL CO2: 21 MMOL/L (ref 20–29)
TSH BLD-ACNC: 1.89 UIU/ML (ref 0.45–4.5)

## 2021-02-09 ASSESSMENT — ANXIETY QUESTIONNAIRES: GAD7 TOTAL SCORE: 8

## 2021-10-06 ENCOUNTER — OFFICE VISIT (OUTPATIENT)
Dept: FAMILY MEDICINE | Facility: CLINIC | Age: 71
End: 2021-10-06

## 2021-10-06 VITALS
TEMPERATURE: 98.7 F | DIASTOLIC BLOOD PRESSURE: 82 MMHG | HEIGHT: 76 IN | BODY MASS INDEX: 27.76 KG/M2 | HEART RATE: 60 BPM | OXYGEN SATURATION: 95 % | RESPIRATION RATE: 16 BRPM | SYSTOLIC BLOOD PRESSURE: 122 MMHG | WEIGHT: 228 LBS

## 2021-10-06 DIAGNOSIS — R06.02 SOB (SHORTNESS OF BREATH): ICD-10-CM

## 2021-10-06 DIAGNOSIS — R05.9 COUGH: Primary | ICD-10-CM

## 2021-10-06 DIAGNOSIS — J06.9 UPPER RESPIRATORY TRACT INFECTION, UNSPECIFIED TYPE: ICD-10-CM

## 2021-10-06 PROCEDURE — 99214 OFFICE O/P EST MOD 30 MIN: CPT | Performed by: NURSE PRACTITIONER

## 2021-10-06 PROCEDURE — 71046 X-RAY EXAM CHEST 2 VIEWS: CPT | Mod: FY | Performed by: NURSE PRACTITIONER

## 2021-10-06 RX ORDER — PREDNISONE 20 MG/1
40 TABLET ORAL DAILY
Qty: 10 TABLET | Refills: 0 | Status: SHIPPED | OUTPATIENT
Start: 2021-10-06 | End: 2021-10-11

## 2021-10-06 RX ORDER — METHYLPHENIDATE HYDROCHLORIDE 20 MG/1
TABLET ORAL
COMMUNITY
Start: 2021-08-12 | End: 2021-10-06

## 2021-10-06 RX ORDER — BENZONATATE 100 MG/1
100 CAPSULE ORAL 3 TIMES DAILY PRN
Qty: 30 CAPSULE | Refills: 1 | Status: SHIPPED | OUTPATIENT
Start: 2021-10-06 | End: 2021-12-10

## 2021-10-06 ASSESSMENT — MIFFLIN-ST. JEOR: SCORE: 1880.7

## 2021-10-06 NOTE — PROGRESS NOTES
"Problem(s) Oriented visit        SUBJECTIVE:                                                    Felix Millard is a 71 year old male who presents to clinic today for the following health issues :    Cough started Friday (5 days ago). Wife had it first and told she has viral illness. Both of them fully vaccinated for Covid-19 and have tested negative for current symptoms. Additional symptoms include headache, shortness of breath, wheezing. Sleeping okay.  Took Dayquil today. Yesterday took Mucinex and cough syrup last night. Both medications helped some.  Albuterol inhaler in the past but didn't help much and caused acid reflux.  History of substance abuse so prefers to not take codeine.  No history of COPD, asthma, pneumonia, bronchitis.  Denies fever, chills, ear pain, sinus pain/pressure, sore throat.    Problem list, Medication list, Allergies, and Medical/Social/Surgical histories reviewed in EPIC and updated as appropriate.   Additional history: as documented    ROS:  4 point ROS completed and negative except noted above, including Gen, HENT, CV, Resp    OBJECTIVE:                                                    /82   Pulse 60   Temp 98.7  F (37.1  C)   Resp 16   Ht 1.93 m (6' 4\")   Wt 103.4 kg (228 lb)   SpO2 95%   BMI 27.75 kg/m    Body mass index is 27.75 kg/m .   GENERAL: Acutely ill appearing elderly male in no distress  EYES: Eyes grossly normal to inspection  HENT: normal cephalic/atraumatic, ear canals and TM's normal, nose and mouth without ulcers or lesions, oropharynx clear, oral mucous membranes moist. Pharynx erythematous  NECK: bilateral anterior cervical adenopathy and no asymmetry, masses, or scars  RESP: decreased breath sounds in bilateral lower lobes  CV: regular rates and rhythm, normal S1 S2, no S3 or S4 and no murmur, click or rub  PSYCH: normal affect & mood    2 view chest x-ray: normal per my read without evidence of infiltrate (pending radiologist review) "     ASSESSMENT/PLAN:                                                      Felix was seen today for cough.    Diagnoses and all orders for this visit:    Cough  -     X-ray Chest 2 vws*  -     benzonatate (TESSALON) 100 MG capsule; Take 1 capsule (100 mg) by mouth 3 times daily as needed for cough    SOB (shortness of breath)  -     X-ray Chest 2 vws*    Upper respiratory tract infection, unspecified type  -     benzonatate (TESSALON) 100 MG capsule; Take 1 capsule (100 mg) by mouth 3 times daily as needed for cough  -     predniSONE (DELTASONE) 20 MG tablet; Take 2 tablets (40 mg) by mouth daily for 5 days    Chest x-ray reassuring that no pneumonia present. Discussed that antibiotics are not helpful in treating viral illness, and that is likely what he has. Prescriptions for Tessalon perles prn for cough and 5 day course of Prednisone sent to pharmacy. Patient to call if symptoms start worsening again and then would prescribe antibiotic.   Recommend fluids, rest, OTC decongestants/expectorants as needed, tylenol or NSAIDs with food.     See Patient Instructions  Patient Instructions   Prednisone: take 2 pills together once daily in the morning with food for 5 days    Tessalon perles: 1 capsule up to 3 times daily as needed for cough    Call if symptoms start worsening again - then I will prescribe antibiotic.      JAY Edgar CNP  [facility]  Family Practice  [facility]  [phone]    For any issues my office # is [phone]

## 2021-10-06 NOTE — PATIENT INSTRUCTIONS
Prednisone: take 2 pills together once daily in the morning with food for 5 days    Tessalon perles: 1 capsule up to 3 times daily as needed for cough    Call if symptoms start worsening again - then I will prescribe antibiotic.

## 2021-10-12 ENCOUNTER — TELEPHONE (OUTPATIENT)
Dept: FAMILY MEDICINE | Facility: CLINIC | Age: 71
End: 2021-10-12

## 2021-10-12 NOTE — TELEPHONE ENCOUNTER
----- Message from JAY Jacobs CNP sent at 10/11/2021 12:33 PM CDT -----  Please let him know x ray was normal.  Thanks,  MEENU

## 2021-12-10 ENCOUNTER — OFFICE VISIT (OUTPATIENT)
Dept: FAMILY MEDICINE | Facility: CLINIC | Age: 71
End: 2021-12-10

## 2021-12-10 VITALS
HEART RATE: 52 BPM | RESPIRATION RATE: 16 BRPM | OXYGEN SATURATION: 97 % | DIASTOLIC BLOOD PRESSURE: 82 MMHG | WEIGHT: 227 LBS | SYSTOLIC BLOOD PRESSURE: 122 MMHG | HEIGHT: 76 IN | BODY MASS INDEX: 27.64 KG/M2

## 2021-12-10 DIAGNOSIS — Z00.00 ENCOUNTER FOR MEDICARE ANNUAL WELLNESS EXAM: Primary | ICD-10-CM

## 2021-12-10 DIAGNOSIS — F10.21 ALCOHOL DEPENDENCE IN REMISSION (H): ICD-10-CM

## 2021-12-10 DIAGNOSIS — F33.42 RECURRENT MAJOR DEPRESSIVE DISORDER, IN FULL REMISSION (H): ICD-10-CM

## 2021-12-10 DIAGNOSIS — M54.50 CHRONIC BILATERAL LOW BACK PAIN WITHOUT SCIATICA: ICD-10-CM

## 2021-12-10 DIAGNOSIS — Z13.6 SCREENING FOR HEART DISEASE: ICD-10-CM

## 2021-12-10 DIAGNOSIS — G89.29 CHRONIC BILATERAL LOW BACK PAIN WITHOUT SCIATICA: ICD-10-CM

## 2021-12-10 DIAGNOSIS — Z12.11 SCREENING FOR COLON CANCER: ICD-10-CM

## 2021-12-10 PROCEDURE — 36415 COLL VENOUS BLD VENIPUNCTURE: CPT | Performed by: FAMILY MEDICINE

## 2021-12-10 PROCEDURE — G0439 PPPS, SUBSEQ VISIT: HCPCS | Performed by: FAMILY MEDICINE

## 2021-12-10 PROCEDURE — 99213 OFFICE O/P EST LOW 20 MIN: CPT | Mod: 25 | Performed by: FAMILY MEDICINE

## 2021-12-10 ASSESSMENT — PATIENT HEALTH QUESTIONNAIRE - PHQ9: SUM OF ALL RESPONSES TO PHQ QUESTIONS 1-9: 0

## 2021-12-10 ASSESSMENT — MIFFLIN-ST. JEOR: SCORE: 1876.17

## 2021-12-10 NOTE — PROGRESS NOTES
"  SUBJECTIVE:   Felix Millard is a 71 year old male who presents for Preventive Visit.  Depression:  Has known history of depression.  Has been on medication for this.  The patient does not report any significant side effects of this medication.  The prior symptoms leading to the original diagnosis and decision to start medication therapy are better.     Appetite is stable.  Sleeping patterns are stable.  No reported thoughts of suicide or homicide.    Last 3 PHQ9 results:  PHQ-9 SCORE 8/15/2016 3/12/2018 6/11/2019 2/8/2021   PHQ-9 Total Score - - - -   PHQ-9 Total Score 2 4 0 0   on same drugs for years sees a NP psych     Hip pains,  Lower back pains.  Long time  Patient has been advised of split billing requirements and indicates understanding:   Are you in the first 12 months of your Medicare Part B coverage?  No    Physical Health:    In general, how would you rate your overall physical health? good    Outside of work, how many days during the week do you exercise? 2-3 days/week    Outside of work, approximately how many minutes a day do you exercise?15-30 minutes    If you drink alcohol do you typically have >3 drinks per day or >7 drinks per week? No    Do you usually eat at least 4 servings of fruit and vegetables a day, include whole grains & fiber and avoid regularly eating high fat or \"junk\" foods? Yes    Do you have any problems taking medications regularly?  No    Do you have any side effects from medications? not applicable    Needs assistance for the following daily activities: no assistance needed    Which of the following safety concerns are present in your home?  none identified     Hearing impairment: No    HEARING FREQUENCY    Right Ear:    500-PASS  1000-PASS  2000-PASS  4000- FAIL      Left Ear:    500-PASS  1000-PASS  2000-PASS  4000- FAIL      Hearing Acuity: Pass      Hearing Assessment: normal    In the past 6 months, have you been bothered by leaking of urine? No, frequent " urination    Mental Health:    In general, how would you rate your overall mental or emotional health? good  PHQ-2 Score:      Do you feel safe in your environment? Yes    Have you ever done Advance Care Planning? (For example, a Health Directive, POLST, or a discussion with a medical provider or your loved ones about your wishes): Yes, advance care planning is on file.    Additional concerns to address?  No    Fall risk:       Cognitive Screenin) Repeat 3 items (Leader, Season, Table)    2) Clock draw: NORMAL  3) 3 item recall: Recalls 3 objects  Results: 3 items recalled: COGNITIVE IMPAIRMENT LESS LIKELY    Mini-CogTM Copyright S Mari. Licensed by the author for use in [facility]; reprinted with permission (soob@.[facility]). All rights reserved.      Do you have sleep apnea, excessive snoring or daytime drowsiness?: no        PROBLEMS TO ADD ON...    Reviewed and updated as needed this visit by clinical staff  Tobacco  Allergies  Meds             Reviewed and updated as needed this visit by Provider               Social History     Tobacco Use     Smoking status: Former Smoker     Types: Cigarettes     Quit date: 2001     Years since quittin.0     Smokeless tobacco: Never Used   Substance Use Topics     Alcohol use: No     Alcohol/week: 0.0 standard drinks     Comment: quit oct 1980                           Current providers sharing in care for this patient include:   Patient Care Team:  Marvle De La Fuente MD as PCP - General (Family Practice)  Marvel De La Fuente MD as Assigned PCP    The following health maintenance items are reviewed in Epic and correct as of today:  Health Maintenance   Topic Date Due     ZOSTER IMMUNIZATION (2 of 3) 2009     COLORECTAL CANCER SCREENING  2018     PHQ-9  2021     FALL RISK ASSESSMENT  2022     MEDICARE ANNUAL WELLNESS VISIT  12/10/2022     LIPID  2023     DTAP/TDAP/TD IMMUNIZATION (3 - Td or Tdap) 2025      "ADVANCE CARE PLANNING  12/10/2026     HEPATITIS C SCREENING  Completed     DEPRESSION ACTION PLAN  Completed     INFLUENZA VACCINE  Completed     Pneumococcal Vaccine: 65+ Years  Completed     AORTIC ANEURYSM SCREENING (SYSTEM ASSIGNED)  Completed     COVID-19 Vaccine  Completed     IPV IMMUNIZATION  Aged Out     MENINGITIS IMMUNIZATION  Aged Out     HEPATITIS B IMMUNIZATION  Aged Out     Lab work is in process      ROS:  Constitutional, HEENT, cardiovascular, pulmonary, GI, , musculoskeletal, neuro, skin, endocrine and psych systems are negative, except as otherwise noted.    OBJECTIVE:   /82   Pulse 52   Resp 16   Ht 1.93 m (6' 4\")   Wt 103 kg (227 lb)   SpO2 97%   BMI 27.63 kg/m   Estimated body mass index is 27.63 kg/m  as calculated from the following:    Height as of this encounter: 1.93 m (6' 4\").    Weight as of this encounter: 103 kg (227 lb).  EXAM:   GENERAL: healthy, alert and no distress  EYES: Eyes grossly normal to inspection, PERRL and conjunctivae and sclerae normal  HENT: ear canals and TM's normal, nose and mouth without ulcers or lesions  NECK: no adenopathy, no asymmetry, masses, or scars and thyroid normal to palpation  RESP: lungs clear to auscultation - no rales, rhonchi or wheezes  CV: regular rate and rhythm, normal S1 S2, no S3 or S4, no murmur, click or rub, no peripheral edema and peripheral pulses strong  ABDOMEN: soft, nontender, no hepatosplenomegaly, no masses and bowel sounds normal  RECTAL: normal sphincter tone, no rectal masses, prostate normal size, smooth, nontender without nodules or masses  MS: no gross musculoskeletal defects noted, no edema  SKIN: no suspicious lesions or rashes  NEURO: Normal strength and tone, mentation intact and speech normal  PSYCH: mentation appears normal, affect normal/bright    Diagnostic Test Results:  Labs reviewed in Epic    ASSESSMENT / PLAN:   Felix was seen today for physical and back pain.    Diagnoses and all orders for this " "visit:    Encounter for Medicare annual wellness exam    Alcoholism in Sobriety    Recurrent major depressive disorder, in full remission (H)    Screening for colon cancer  -     Adult Gastro Ref - Procedure Only; Future    Screening for heart disease  -     Lipid Panel ([facility])  -     Comp. Metabolic Panel (14) ([facility])        Patient has been advised of split billing requirements and indicates understanding: Yes    COUNSELING:  Reviewed preventive health counseling, as reflected in patient instructions       Regular exercise       Healthy diet/nutrition    Estimated body mass index is 27.63 kg/m  as calculated from the following:    Height as of this encounter: 1.93 m (6' 4\").    Weight as of this encounter: 103 kg (227 lb).        He reports that he quit smoking about 20 years ago. His smoking use included cigarettes. He has never used smokeless tobacco.    Appropriate preventive services were discussed with this patient, including applicable screening as appropriate for cardiovascular disease, diabetes, osteopenia/osteoporosis, and glaucoma.  As appropriate for age/gender, discussed screening for colorectal cancer, prostate cancer, breast cancer, and cervical cancer. Checklist reviewing preventive services available has been given to the patient.    Reviewed patients plan of care and provided an AVS. The Basic Care Plan (routine screening as documented in Health Maintenance) for Felix meets the Care Plan requirement. This Care Plan has been established and reviewed with the Patient.    Counseling Resources:  ATP IV Guidelines  Pooled Cohorts Equation Calculator  Breast Cancer Risk Calculator  BRCA-Related Cancer Risk Assessment: FHS-7 Tool  FRAX Risk Assessment  ICSI Preventive Guidelines  Dietary Guidelines for Americans, 2010  USDA's MyPlate  ASA Prophylaxis  Lung CA Screening    Marvel De La Fuente MD  [facility]  "

## 2021-12-10 NOTE — PATIENT INSTRUCTIONS
Patient Education   Personalized Prevention Plan  You are due for the preventive services outlined below.  Your care team is available to assist you in scheduling these services.  If you have already completed any of these items, please share that information with your care team to update in your medical record.  Health Maintenance Due   Topic Date Due     Discuss Advance Care Planning  Never done     Zoster (Shingles) Vaccine (2 of 3) 12/18/2009     Colorectal Cancer Screening  07/23/2018     Depression Assessment  08/08/2021     Flu Vaccine (1) 09/01/2021     COVID-19 Vaccine (3 - Booster for Pfizer series) 10/02/2021         CONTINUE TO WORK ON BACK FLEXIBILITY THROUGH DOMESTIC CHORES AT YOUR OWN PACE

## 2021-12-10 NOTE — LETTER
[facility] [address] [place]  [address]  Phone: [phone]    December 15, 2021      Felix Millard  [address]   [place] [address]              Dear Felix,     I am writing to report that your included test results are as expected.  Your cholesterol is improved from 3 years ago.       Marvel De La Fuente MD       Results for orders placed or performed in visit on 12/10/21   Lipid Panel ([facility])     Status: Abnormal   Result Value Ref Range    Cholesterol 234 (H) 100 - 199 mg/dL    Triglycerides 209 (H) 0 - 149 mg/dL    HDL Cholesterol 46 >39 mg/dL    VLDL Cholesterol Ralhp 38 5 - 40 mg/dL    LDL Cholesterol Calculated 150 (H) 0 - 99 mg/dL    LDL/HDL Ratio 3.3 0.0 - 3.6 ratio    Narrative    Performed at:  01 - [facility] [place]  [address] [place] [address] [address], [place], [place]  [id number]  : Saad Melvin MD, Phone:  [phone]   Comp. Metabolic Panel (14) ([facility])     Status: Abnormal   Result Value Ref Range    Glucose 119 (H) 65 - 99 mg/dL    Urea Nitrogen 23 8 - 27 mg/dL    Creatinine 1.25 0.76 - 1.27 mg/dL    eGFR If NonAfricn Am 57 (L) >59 mL/min/1.73    eGFR If Africn Am 66 >59 mL/min/1.73    BUN/Creatinine Ratio 18 10 - 24    Sodium 140 134 - 144 mmol/L    Potassium 4.7 3.5 - 5.2 mmol/L    Chloride 104 96 - 106 mmol/L    Total CO2 19 (L) 20 - 29 mmol/L    Calcium 9.2 8.6 - 10.2 mg/dL    Protein Total 6.8 6.0 - 8.5 g/dL    Albumin 4.4 3.7 - 4.7 g/dL    Globulin, Total 2.4 1.5 - 4.5 g/dL    A/G Ratio 1.8 1.2 - 2.2    Bilirubin Total 0.6 0.0 - 1.2 mg/dL    Alkaline Phosphatase 51 44 - 121 IU/L    AST 27 0 - 40 IU/L    ALT 21 0 - 44 IU/L    Narrative    Performed at:  01 - [facility] [place]  [address], [place], [place]  [id number]  : Saad Melvin MD, Phone:  [phone]

## 2021-12-12 LAB
ALBUMIN SERPL-MCNC: 4.4 G/DL (ref 3.7–4.7)
ALBUMIN/GLOB SERPL: 1.8 {RATIO} (ref 1.2–2.2)
ALP SERPL-CCNC: 51 IU/L (ref 44–121)
ALT SERPL-CCNC: 21 IU/L (ref 0–44)
AST SERPL-CCNC: 27 IU/L (ref 0–40)
BILIRUB SERPL-MCNC: 0.6 MG/DL (ref 0–1.2)
BUN SERPL-MCNC: 23 MG/DL (ref 8–27)
BUN/CREATININE RATIO: 18 (ref 10–24)
CALCIUM SERPL-MCNC: 9.2 MG/DL (ref 8.6–10.2)
CHLORIDE SERPLBLD-SCNC: 104 MMOL/L (ref 96–106)
CHOLEST SERPL-MCNC: 234 MG/DL (ref 100–199)
CREAT SERPL-MCNC: 1.25 MG/DL (ref 0.76–1.27)
EGFR IF AFRICN AM: 66 ML/MIN/1.73
EGFR IF NONAFRICN AM: 57 ML/MIN/1.73
GLOBULIN, TOTAL: 2.4 G/DL (ref 1.5–4.5)
GLUCOSE SERPL-MCNC: 119 MG/DL (ref 65–99)
HDLC SERPL-MCNC: 46 MG/DL
LDL/HDL RATIO: 3.3 RATIO (ref 0–3.6)
LDLC SERPL CALC-MCNC: 150 MG/DL (ref 0–99)
POTASSIUM SERPL-SCNC: 4.7 MMOL/L (ref 3.5–5.2)
PROT SERPL-MCNC: 6.8 G/DL (ref 6–8.5)
SODIUM SERPL-SCNC: 140 MMOL/L (ref 134–144)
TOTAL CO2: 19 MMOL/L (ref 20–29)
TRIGL SERPL-MCNC: 209 MG/DL (ref 0–149)
VLDLC SERPL CALC-MCNC: 38 MG/DL (ref 5–40)

## 2021-12-15 NOTE — RESULT ENCOUNTER NOTE
Dear Felix,   I am writing to report that your included test results are as expected.  Your cholesterol is improved from 3 years ago.  Marvel De La Fuente MD

## 2022-06-10 ENCOUNTER — OFFICE VISIT (OUTPATIENT)
Dept: FAMILY MEDICINE | Facility: CLINIC | Age: 72
End: 2022-06-10

## 2022-06-10 VITALS
BODY MASS INDEX: 27.1 KG/M2 | WEIGHT: 222.6 LBS | HEART RATE: 50 BPM | OXYGEN SATURATION: 96 % | TEMPERATURE: 97.6 F | SYSTOLIC BLOOD PRESSURE: 136 MMHG | DIASTOLIC BLOOD PRESSURE: 82 MMHG

## 2022-06-10 DIAGNOSIS — R00.1 BRADYCARDIA: Primary | ICD-10-CM

## 2022-06-10 DIAGNOSIS — F10.21 ALCOHOL DEPENDENCE IN REMISSION (H): ICD-10-CM

## 2022-06-10 DIAGNOSIS — F33.42 RECURRENT MAJOR DEPRESSIVE DISORDER, IN FULL REMISSION (H): ICD-10-CM

## 2022-06-10 DIAGNOSIS — R06.09 DOE (DYSPNEA ON EXERTION): ICD-10-CM

## 2022-06-10 LAB
% GRANULOCYTES: 61 % (ref 42.2–75.2)
HCT VFR BLD AUTO: 44.4 % (ref 39–51)
HEMOGLOBIN: 15.4 G/DL (ref 13.4–17.5)
LYMPHOCYTES NFR BLD AUTO: 31 % (ref 20.5–51.1)
MCH RBC QN AUTO: 32.1 PG (ref 27–31)
MCHC RBC AUTO-ENTMCNC: 34.8 G/DL (ref 33–37)
MCV RBC AUTO: 92.1 FL (ref 80–100)
MONOCYTES NFR BLD AUTO: 8 % (ref 1.7–9.3)
PLATELET # BLD AUTO: 301 K/UL (ref 140–450)
RBC # BLD AUTO: 4.81 X10/CMM (ref 4.2–5.9)
WBC # BLD AUTO: 7.5 X10/CMM (ref 3.8–11)

## 2022-06-10 PROCEDURE — 36415 COLL VENOUS BLD VENIPUNCTURE: CPT | Performed by: FAMILY MEDICINE

## 2022-06-10 PROCEDURE — 99214 OFFICE O/P EST MOD 30 MIN: CPT | Performed by: FAMILY MEDICINE

## 2022-06-10 PROCEDURE — 85025 COMPLETE CBC W/AUTO DIFF WBC: CPT | Performed by: FAMILY MEDICINE

## 2022-06-10 PROCEDURE — 93000 ELECTROCARDIOGRAM COMPLETE: CPT | Performed by: FAMILY MEDICINE

## 2022-06-10 ASSESSMENT — ANXIETY QUESTIONNAIRES
GAD7 TOTAL SCORE: 0
1. FEELING NERVOUS, ANXIOUS, OR ON EDGE: NOT AT ALL
6. BECOMING EASILY ANNOYED OR IRRITABLE: NOT AT ALL
3. WORRYING TOO MUCH ABOUT DIFFERENT THINGS: NOT AT ALL
2. NOT BEING ABLE TO STOP OR CONTROL WORRYING: NOT AT ALL
7. FEELING AFRAID AS IF SOMETHING AWFUL MIGHT HAPPEN: NOT AT ALL
5. BEING SO RESTLESS THAT IT IS HARD TO SIT STILL: NOT AT ALL
GAD7 TOTAL SCORE: 0

## 2022-06-10 ASSESSMENT — PATIENT HEALTH QUESTIONNAIRE - PHQ9
5. POOR APPETITE OR OVEREATING: NOT AT ALL
SUM OF ALL RESPONSES TO PHQ QUESTIONS 1-9: 0

## 2022-06-10 NOTE — PROGRESS NOTES
Problem(s) Oriented visit    General and Resp. completed and negative except as noted aboveROS:  Unable to swim without getting winded. For the past two weeks.  No chest pain, no recent infections.  Did have COVID around 18 months ago.       HISTORY:   reports no history of alcohol use.   reports that he quit smoking about 20 years ago. His smoking use included cigarettes. He has never used smokeless tobacco.    Past Medical History:   Diagnosis Date     ADD (attention deficit disorder)      COVID-19 12/2020     Depressive disorder, not elsewhere classified      Diverticulosis      Eczema      History of shingles      Hyperlipidaemia LDL goal < 100      Major depression in complete remission (H) 8/15/2016     Seasonal allergies      Tinnitus      Past Surgical History:   Procedure Laterality Date     CHOLECYSTECTOMY, LAPOROSCOPIC  1980's    Cholecystectomy, Laparoscopic     HERNIORRHAPHY VENTRAL N/A 4/28/2016    Procedure: HERNIORRHAPHY VENTRAL;  Surgeon: Igor Mcnair MD;  Location: [facility]     LAPAROSCOPIC APPENDECTOMY N/A 6/18/2015    Procedure: LAPAROSCOPIC APPENDECTOMY;  Surgeon: Igor Mcnair MD;  Location:  OR     LAPAROSCOPIC ASSISTED COLECTOMY LEFT (DESCENDING) Left 6/18/2015    Procedure: LAPAROSCOPIC ASSISTED COLECTOMY LEFT (DESCENDING);  Surgeon: Igor Mcnair MD;  Location:  OR     LAPAROSCOPIC HERNIORRHAPHY INGUINAL BILATERAL Bilateral 4/28/2016    Procedure: LAPAROSCOPIC HERNIORRHAPHY INGUINAL BILATERAL;  Surgeon: Igor Mcnair MD;  Location: [facility]     LAPAROSCOPIC HERNIORRHAPHY VENTRAL N/A 6/18/2015    Procedure: LAPAROSCOPIC HERNIORRHAPHY VENTRAL;  Surgeon: Igor Mcnair MD;  Location:  OR     LAPAROSCOPIC HERNIORRHAPHY VENTRAL N/A 4/28/2016    Procedure: LAPAROSCOPIC HERNIORRHAPHY VENTRAL;  Surgeon: Igor Mcnair MD;  Location: [facility]     LAPAROSCOPIC LYSIS ADHESIONS N/A 6/18/2015    Procedure: LAPAROSCOPIC LYSIS ADHESIONS;  Surgeon: Xu  "Igor Vanessa MD;  Location:  OR     PHACOEMULSIFICATION CLEAR CORNEA WITH STANDARD INTRAOCULAR LENS IMPLANT Right 8/22/2016    Procedure: PHACOEMULSIFICATION CLEAR CORNEA WITH STANDARD INTRAOCULAR LENS IMPLANT;  Surgeon: Lina, Jaylan KRAMER MD;  Location:  [facility]     VARICOCELECTOMY  1950's     [facility] BOWEL TO BOWEL ANASTOMOSIS         EXAM:  BP: 136/82   Pulse: 50    Temp: 97.6    Wt Readings from Last 2 Encounters:   06/10/22 101 kg (222 lb 9.6 oz)   12/10/21 103 kg (227 lb)       BMI= Body mass index is 27.1 kg/m .    EXAM:  APPEARANCE: = healthy, alert, no distress and seems a little pale.  Resp effort = Calm regular breathing  Breath Sounds = Good air movement with no rales or rhonchi in any lung fields  Heart Rate, Rythym, & sounds (no Murm)  = Regular rate and rythym with no S3, S4, or murmer appreciated.  Carotid Art's = Pulses full and equal and no bruits appreciated      Assessment/Plan:  Felix was seen today for shortness of breath.    Diagnoses and all orders for this visit:    MARTIN (dyspnea on exertion)  -     Cancel: X-ray Chest 2 vws*  -     EKG 12-lead complete w/read - Clinics  -     CBC with Diff/Plt ([facility])  -     Comp. Metabolic Panel (14) ([facility])  -     PSA Serum ([facility])  -     TSH ([facility])    Recurrent major depressive disorder, in full remission (H)    Sobriety Since October 1980!    Bradycardia    discussed with cardiology,  They will see post above   Do not think is afib and no need for anticoagulation    COUNSELING:   reports that he quit smoking about 20 years ago. His smoking use included cigarettes. He has never used smokeless tobacco.    Estimated body mass index is 27.1 kg/m  as calculated from the following:    Height as of 12/10/21: 1.93 m (6' 4\").    Weight as of this encounter: 101 kg (222 lb 9.6 oz).       Appropriate preventive services were discussed with this patient, including applicable screening as appropriate for cardiovascular disease, diabetes, osteopenia/osteoporosis, " and glaucoma.  As appropriate for age/gender, discussed screening for colorectal cancer, prostate cancer, breast cancer, and cervical cancer. Checklist reviewing preventive services available has been given to the patient.    Reviewed patients plan of care and provided an AVS. The Basic Care Plan (routine screening as documented in Health Maintenance) for Felix meets the Care Plan requirement. This Care Plan has been established and reviewed with the  Patient.      The following health maintenance items are reviewed in Epic and correct as of today:  Health Maintenance   Topic Date Due     ZOSTER IMMUNIZATION (2 of 3) 12/18/2009     COLORECTAL CANCER SCREENING  07/23/2018     MEDICARE ANNUAL WELLNESS VISIT  12/10/2022     PHQ-9  12/10/2022     FALL RISK ASSESSMENT  06/10/2023     DTAP/TDAP/TD IMMUNIZATION (3 - Td or Tdap) 12/21/2025     LIPID  12/10/2026     ADVANCE CARE PLANNING  12/10/2026     HEPATITIS C SCREENING  Completed     DEPRESSION ACTION PLAN  Completed     INFLUENZA VACCINE  Completed     Pneumococcal Vaccine: 65+ Years  Completed     AORTIC ANEURYSM SCREENING (SYSTEM ASSIGNED)  Completed     COVID-19 Vaccine  Completed     IPV IMMUNIZATION  Aged Out     MENINGITIS IMMUNIZATION  Aged Out     HEPATITIS B IMMUNIZATION  Aged Out       Marvel De La Fuente  [facility]  For any issues my office # is [phone]

## 2022-06-11 LAB
ALBUMIN SERPL-MCNC: 4.4 G/DL (ref 3.7–4.7)
ALBUMIN/GLOB SERPL: 1.6 {RATIO} (ref 1.2–2.2)
ALP SERPL-CCNC: 57 IU/L (ref 44–121)
ALT SERPL-CCNC: 24 IU/L (ref 0–44)
AST SERPL-CCNC: 23 IU/L (ref 0–40)
BILIRUB SERPL-MCNC: 0.5 MG/DL (ref 0–1.2)
BUN SERPL-MCNC: 22 MG/DL (ref 8–27)
BUN/CREATININE RATIO: 16 (ref 10–24)
CALCIUM SERPL-MCNC: 9.4 MG/DL (ref 8.6–10.2)
CHLORIDE SERPLBLD-SCNC: 106 MMOL/L (ref 96–106)
CREAT SERPL-MCNC: 1.35 MG/DL (ref 0.76–1.27)
EGFR: 55 ML/MIN/1.73
GLOBULIN, TOTAL: 2.7 G/DL (ref 1.5–4.5)
GLUCOSE SERPL-MCNC: 118 MG/DL (ref 65–99)
POTASSIUM SERPL-SCNC: 4.7 MMOL/L (ref 3.5–5.2)
PROT SERPL-MCNC: 7.1 G/DL (ref 6–8.5)
PSA NG/ML: 1.3 NG/ML (ref 0–4)
SODIUM SERPL-SCNC: 140 MMOL/L (ref 134–144)
TOTAL CO2: 19 MMOL/L (ref 20–29)
TSH BLD-ACNC: 1.63 UIU/ML (ref 0.45–4.5)

## 2022-06-13 DIAGNOSIS — I48.91 A-FIB (H): ICD-10-CM

## 2022-06-13 DIAGNOSIS — R00.1 BRADYCARDIA: Primary | ICD-10-CM

## 2022-06-14 NOTE — RESULT ENCOUNTER NOTE
Dear Felix,     I am writing to report that your included test results are as expected.  Most labs contain some results that are slightly outside of the normal range, I have reviewed each of these results and they require no changes at this time.    Marvel De La Fuente MD

## 2022-06-17 ENCOUNTER — OFFICE VISIT (OUTPATIENT)
Dept: CARDIOLOGY | Facility: CLINIC | Age: 72
End: 2022-06-17
Attending: FAMILY MEDICINE
Payer: MEDICARE

## 2022-06-17 VITALS
HEIGHT: 76 IN | SYSTOLIC BLOOD PRESSURE: 136 MMHG | DIASTOLIC BLOOD PRESSURE: 82 MMHG | HEART RATE: 51 BPM | OXYGEN SATURATION: 97 % | BODY MASS INDEX: 27.52 KG/M2 | WEIGHT: 226 LBS

## 2022-06-17 DIAGNOSIS — R06.09 DYSPNEA ON EXERTION: Primary | ICD-10-CM

## 2022-06-17 DIAGNOSIS — R00.2 PALPITATIONS: ICD-10-CM

## 2022-06-17 DIAGNOSIS — I10 BENIGN ESSENTIAL HYPERTENSION: ICD-10-CM

## 2022-06-17 DIAGNOSIS — R00.1 BRADYCARDIA: ICD-10-CM

## 2022-06-17 PROCEDURE — 93010 ELECTROCARDIOGRAM REPORT: CPT | Performed by: INTERNAL MEDICINE

## 2022-06-17 PROCEDURE — 99205 OFFICE O/P NEW HI 60 MIN: CPT | Performed by: INTERNAL MEDICINE

## 2022-06-17 RX ORDER — LOSARTAN POTASSIUM 25 MG/1
25 TABLET ORAL DAILY
Qty: 30 TABLET | Refills: 4 | Status: SHIPPED | OUTPATIENT
Start: 2022-06-17 | End: 2022-07-21

## 2022-06-17 NOTE — LETTER
"6/17/2022    Marvel De La Fuente MD  [address]  [facility] [place] [address]    RE: Felix Millard       Dear Colleague,     I had the pleasure of seeing Felix Millard in the [facility].    Clinic visit note dictated. Dictation reference number - [id number]          Today's clinic visit entailed:  Review of the result(s) of each unique test - ECG, labs, stress echocardiogram.  60 minutes spent on the date of the encounter doing chart review, history and exam, documentation and further activities per the note  I renewed prescriptions, ordered future follow-up and testing, and provided a typed visit summary.      Vitals: /82   Pulse 51   Ht 1.93 m (6' 4\")   Wt 102.5 kg (226 lb)   SpO2 97%   BMI 27.51 kg/m    Wt Readings from Last 5 Encounters:   06/17/22 102.5 kg (226 lb)   06/10/22 101 kg (222 lb 9.6 oz)   12/10/21 103 kg (227 lb)   10/06/21 103.4 kg (228 lb)   02/08/21 93.7 kg (206 lb 9.6 oz)       PHYSICAL EXAM:  Constitutional: Comfortable at rest. Cooperative, alert and oriented, well developed, well nourished. Tall.  Eyes: Pupils equal and round, conjunctivae and lids unremarkable, sclera white, no xanthalasma,   ENT: Satisfactory dentition.  No cyanosis or pallor.  Neck: No thyromegaly.     Respiratory: Normal respiratory effort with symmetrical chest wall movements and no use of accessory muscles. Bilateral normal breath sounds. No rales or wheeze.  Cardiovascular: Normal jugular venous pulse and pressure.  Normal carotid pulse character and volume.  No carotid bruit.  Apical impulse is undisplaced and normal to palpation without parasternal heave or thrill.  Heart sounds are normal and regular.  No S3 or S4.  No audible murmur or friction rub.  GI: Soft, nontender, no hepatosplenomegaly, no masses, active bowel sounds.    Skin: No rash, erythema, ecchymosis.  Musculoskeletal: Normal muscle tone and strength. Normal gait. No spinal deformities.  Neuropsychiatric: Oriented to " time place and person.  Affect normal.  No gross motor deficits.  Extremities: No edema, clubbing or deformities.      Encounter Diagnoses   Name Primary?     Dyspnea on exertion Yes     Palpitations      Bradycardia      Benign essential hypertension          Orders Placed This Encounter   Procedures     Basic metabolic panel     N terminal pro BNP outpatient     Follow-Up with Cardiology TONYA     Leadless EKG Monitor 3 to 7 Days     Exercise Stress Echocardiogram       CURRENT MEDICATIONS:  Current Outpatient Medications   Medication Sig Dispense Refill     Aspirin-Acetaminophen-Caffeine (EXCEDRIN EXTRA STRENGTH PO) Take 1 tablet by mouth 2 times daily as needed        buPROPion (WELLBUTRIN SR) 150 MG 12 hr tablet Take 150 mg by mouth daily.       Chlorpheniramine-Phenylephrine (SINUS & ALLERGY PO)        escitalopram (LEXAPRO) 20 MG tablet Take 40 mg by mouth       losartan (COZAAR) 25 MG tablet Take 1 tablet (25 mg) by mouth daily 30 tablet 4     Methylphenidate HCl (RITALIN PO) Take 20 mg by mouth 2 times daily Reported on 2/22/2017       Multiple Vitamins-Minerals (CENTRUM PO) Take 1 tablet by mouth daily Reported on 2/22/2017       VITAMIN D, CHOLECALCIFEROL, PO Take 1,000 Units by mouth daily           ALLERGIES:  Allergies   Allergen Reactions     Gluten Meal Fatigue     Augmentin Hives     Ciprofloxacin Hives     Wheat Bran Nausea     Oxycodone Anxiety       PAST MEDICAL HISTORY:    Past Medical History:   Diagnosis Date     ADD (attention deficit disorder)      COVID-19 12/2020    Occasional Dyspea     Depressive disorder, not elsewhere classified      Diverticulosis      Eczema      History of shingles      Hyperlipidaemia LDL goal < 100      Major depression in complete remission (H) 8/15/2016     Seasonal allergies      Tinnitus        PAST SURGICAL HISTORY:    Past Surgical History:   Procedure Laterality Date     CHOLECYSTECTOMY, LAPOROSCOPIC  1980's    Cholecystectomy, Laparoscopic     HERNIORRHAPHY  VENTRAL N/A 2016    Procedure: HERNIORRHAPHY VENTRAL;  Surgeon: Igor Mcnair MD;  [facility]:  [facility]     LAPAROSCOPIC APPENDECTOMY N/A 2015    Procedure: LAPAROSCOPIC APPENDECTOMY;  Surgeon: Igor Mcnair MD;  Location: [facility]     LAPAROSCOPIC ASSISTED COLECTOMY LEFT (DESCENDING) Left 2015    Procedure: LAPAROSCOPIC ASSISTED COLECTOMY LEFT (DESCENDING);  Surgeon: Igor Mcnair MD;  Location: [facility]     LAPAROSCOPIC HERNIORRHAPHY INGUINAL BILATERAL Bilateral 2016    Procedure: LAPAROSCOPIC HERNIORRHAPHY INGUINAL BILATERAL;  Surgeon: Igor Mcnair MD;  [facility]     LAPAROSCOPIC HERNIORRHAPHY VENTRAL N/A 2015    Procedure: LAPAROSCOPIC HERNIORRHAPHY VENTRAL;  Surgeon: Igor Mcnair MD;  [facility]: [facility]     LAPAROSCOPIC HERNIORRHAPHY VENTRAL N/A 2016    Procedure: LAPAROSCOPIC HERNIORRHAPHY VENTRAL;  Surgeon: Igor Mcnair MD;  [facility]:  [facility]     LAPAROSCOPIC LYSIS ADHESIONS N/A 2015    Procedure: LAPAROSCOPIC LYSIS ADHESIONS;  Surgeon: Igor Mcnair MD;  Location:  OR     PHACOEMULSIFICATION CLEAR CORNEA WITH STANDARD INTRAOCULAR LENS IMPLANT Right 2016    Procedure: PHACOEMULSIFICATION CLEAR CORNEA WITH STANDARD INTRAOCULAR LENS IMPLANT;  Surgeon: Jaylan Mills MD;  Location:  [facility]     VARICOCELECTOMY  1950's     ZZC BOWEL TO BOWEL ANASTOMOSIS         FAMILY HISTORY:    Family History   Problem Relation Age of Onset     Atrial fibrillation Mother      Cerebrovascular Disease Father      Diabetes Maternal Grandmother        SOCIAL HISTORY:    Social History     Socioeconomic History     Marital status:      Spouse name: None     Number of children: None     Years of education: None     Highest education level: None   Tobacco Use     Smoking status: Former Smoker     Packs/day: 1.00     Years: 30.00     Pack years: 30.00     Types: Cigarettes     Quit date: 2001     Years since quittin.6      "Smokeless tobacco: Never Used   Substance and Sexual Activity     Alcohol use: No     Alcohol/week: 0.0 standard drinks     Comment: quit oct 1980     Drug use: Never     Sexual activity: Yes         CC  REFERRING PROVIDER:  Marvel De La Fuente MD  [address] [place] [address]                  Service Date: 06/17/2022    PRIMARY AND REFERRING PROVIDER:  Dr. Marvel De La Fuente    REASONS FOR VISIT:    1.  New-onset dyspnea on exertion.  2.  Question of atrial fibrillation.    HISTORY OF PRESENT ILLNESS:    Chemo Millard is new to Cardiology.  He is a 72-year-old  gentleman, retired, very tall at 6 feet 5 inches with a BMI of 27 kg/m2 with stable depression, former ethanol abuse (quit several years ago), former tobacco user (30 pack years, quit in 2001), stage III CKD, untreated hyperlipidemia (per patient preference).    The patient went to see Dr. De La Fuente recently reporting shortness of breath.  He had a COVID infection a year and a half ago but after that, returned to his longstanding exercise regimen of swimming a mile and a half most days of the week.  However, for the last 3-4 weeks, he has been short of breath with his accustomed swimming and also walking up a couple flights of stairs.  No symptoms at rest.  No chest pain, leg swelling, orthopnea, PND.    His other symptom is that of \"pounding of the chest which I've had for several years.\"  He occasionally has orthostatic dizziness, is not on any blood pressure-lowering medications.    At his doctor's office, an ECG was done which I personally reviewed.  The computer-generated report had said atrial fibrillation with slow ventricular response with a competing junctional pacemaker.  Per my review, this is sinus bradycardia with premature atrial complexs.    Dr. De La Fuente ordered a heart monitor, transesophageal echocardiogram (I wonder if this was done in error and he meant a transthoracic) and Cardiology referral.  The patient has not " scheduled testing yet because he preferred a cardiology consult first.    RISK FACTORS:  Age 72 years, former tobacco user, family history of stroke in father and atrial fibrillation in mother, untreated hyperlipidemia (total cholesterol 234, HDL 46, , triglycerides 209 (tried a statin decades ago and it caused him to have flu-like symptoms and he has declined all statins since) and stage IIIA CKD with a creatinine of 1.3.    DIAGNOSTIC DATA:    ECG reviewed as above.    LABORATORIES:  As above.  In addition, his CBC is normal.  Potassium 4.7.    I reviewed his previous exercise echocardiogram done in 2018.  The indications are dyspnea on exertion but the patient does not recall having symptoms then.  He had above-average exercise capacity of 10 METs, stress was negative for ischemia but he had a hypertensive blood pressure response with a peak BP of 228/80 mmHg.    PHYSICAL EXAMINATION:  Detailed exam attached to this note.  VITAL SIGNS:  /82, pulse 51 per minute and regular, height 6 feet 5 inches, weight 226 pounds, BMI 28 kg/m2.  Sats 97% on room air.  CARDIOVASCULAR:  Regular heart sounds, undisplaced apical impulse, normal JVP.  No murmur, no pericardial friction rub.    RESPIRATORY:  Lungs are clear to auscultation without rales or wheeze.    EXTREMITIES:  No edema.  ABDOMEN:  Soft, nontender, no hepatosplenomegaly.    DIAGNOSES:    1.  New diagnosis of benign essential hypertension with hypertensive response to exercise.  2.  New diagnosis of exertional dyspnea over the last 3 weeks.  3.  Question of atrial fibrillation.  4.  Sinus bradycardia.  5.  Hyperlipidemia and lipid-lowering therapy counseling.  6.  Former tobacco user.  7.  Stage IIIA chronic kidney disease.      ASSESSMENT:    Chemo clearly has new-onset exertional dyspnea with accustomed exercise over the last 3 weeks.  On review of his previous stress echo, I note a hypertensive response to exercise and I wonder if this is the  problem.  He also has chronic kidney disease without obvious etiology, and this may be due to unrecognized hypotension. I will empirically start him on antihypertensive therapy and obtain repeat stress testing.  He has risk factors and coronary artery disease needs to be ruled out.      I reviewed his ECG and do not think it is atrial fibrillation.  We will get a heart monitor for confirmation.  I think the sinus bradycardia is due to his good physiological status and regular exercise regimen.    PLAN:    1.  Start losartan 25 mg daily.  Side effect profile discussed.  New prescription sent.  Check basic metabolic panel in 3-4 weeks on followup.  2.  Talked about statin therapy.  I think we should target him to a goal of 70 or below based on his risk factors.  He did not want to address that today.  If he changes his mind, I recommend rosuvastatin 5 mg daily and up-titrate as tolerated.  3.  7-day heart monitor.  4.  Stress exercise echocardiogram.  5.  Transesophageal echocardiogram not indicated and should be canceled.  6.  Basic metabolic panel and follow up with Cardiology TONYA.  7.  Addressed the patient's frustrations over the various tests including the NARESH ordered by primary team and helped him with his health-related anxiety with compassionate counseling.  8.  Detailed visit summary provided.    TOTAL TIME TODAY:  60 minutes.    Fermin Wahl MD        D: 2022   T: 2022   MT: hero    Name:     DAFNE ALFARO  MRN:      [id number]        Account:      [id number]   :      1950           Service Date: 2022       Document: [id number]      Thank you for allowing me to participate in the care of your patient.      Sincerely,     Fermin Wahl MD     [facility]  cc:   Marvel De La Fuente MD  [address] [place] [address]

## 2022-06-17 NOTE — PATIENT INSTRUCTIONS
1.  Start a new blood pressure lowering medication called losartan 25 mg.  Take 1 tablet daily in the morning.  New prescription sent.    2.  Exercise echocardiogram.    3.  7-day heart rhythm monitor (Zio patch).    4.  Basic metabolic panel and follow-up with cardiology TONYA.      If you have any questions or concerns, please contact my nurses at [phone].

## 2022-06-17 NOTE — PROGRESS NOTES
"  Clinic visit note dictated. Dictation reference number - [id number]          Today's clinic visit entailed:  Review of the result(s) of each unique test - ECG, labs, stress echocardiogram.  60 minutes spent on the date of the encounter doing chart review, history and exam, documentation and further activities per the note  I renewed prescriptions, ordered future follow-up and testing, and provided a typed visit summary.      Vitals: /82   Pulse 51   Ht 1.93 m (6' 4\")   Wt 102.5 kg (226 lb)   SpO2 97%   BMI 27.51 kg/m    Wt Readings from Last 5 Encounters:   06/17/22 102.5 kg (226 lb)   06/10/22 101 kg (222 lb 9.6 oz)   12/10/21 103 kg (227 lb)   10/06/21 103.4 kg (228 lb)   02/08/21 93.7 kg (206 lb 9.6 oz)       PHYSICAL EXAM:  Constitutional: Comfortable at rest. Cooperative, alert and oriented, well developed, well nourished. Tall.  Eyes: Pupils equal and round, conjunctivae and lids unremarkable, sclera white, no xanthalasma,   ENT: Satisfactory dentition.  No cyanosis or pallor.  Neck: No thyromegaly.     Respiratory: Normal respiratory effort with symmetrical chest wall movements and no use of accessory muscles. Bilateral normal breath sounds. No rales or wheeze.  Cardiovascular: Normal jugular venous pulse and pressure.  Normal carotid pulse character and volume.  No carotid bruit.  Apical impulse is undisplaced and normal to palpation without parasternal heave or thrill.  Heart sounds are normal and regular.  No S3 or S4.  No audible murmur or friction rub.  GI: Soft, nontender, no hepatosplenomegaly, no masses, active bowel sounds.    Skin: No rash, erythema, ecchymosis.  Musculoskeletal: Normal muscle tone and strength. Normal gait. No spinal deformities.  Neuropsychiatric: Oriented to time place and person.  Affect normal.  No gross motor deficits.  Extremities: No edema, clubbing or deformities.      Encounter Diagnoses   Name Primary?     Dyspnea on exertion Yes     Palpitations      " Bradycardia      Benign essential hypertension          Orders Placed This Encounter   Procedures     Basic metabolic panel     N terminal pro BNP outpatient     Follow-Up with Cardiology TONYA     Leadless EKG Monitor 3 to 7 Days     Exercise Stress Echocardiogram       CURRENT MEDICATIONS:  Current Outpatient Medications   Medication Sig Dispense Refill     Aspirin-Acetaminophen-Caffeine (EXCEDRIN EXTRA STRENGTH PO) Take 1 tablet by mouth 2 times daily as needed        buPROPion (WELLBUTRIN SR) 150 MG 12 hr tablet Take 150 mg by mouth daily.       Chlorpheniramine-Phenylephrine (SINUS & ALLERGY PO)        escitalopram (LEXAPRO) 20 MG tablet Take 40 mg by mouth       losartan (COZAAR) 25 MG tablet Take 1 tablet (25 mg) by mouth daily 30 tablet 4     Methylphenidate HCl (RITALIN PO) Take 20 mg by mouth 2 times daily Reported on 2/22/2017       Multiple Vitamins-Minerals (CENTRUM PO) Take 1 tablet by mouth daily Reported on 2/22/2017       VITAMIN D, CHOLECALCIFEROL, PO Take 1,000 Units by mouth daily           ALLERGIES:  Allergies   Allergen Reactions     Gluten Meal Fatigue     Augmentin Hives     Ciprofloxacin Hives     Wheat Bran Nausea     Oxycodone Anxiety       PAST MEDICAL HISTORY:    Past Medical History:   Diagnosis Date     ADD (attention deficit disorder)      COVID-19 12/2020    Occasional Dyspea     Depressive disorder, not elsewhere classified      Diverticulosis      Eczema      History of shingles      Hyperlipidaemia LDL goal < 100      Major depression in complete remission (H) 8/15/2016     Seasonal allergies      Tinnitus        PAST SURGICAL HISTORY:    Past Surgical History:   Procedure Laterality Date     CHOLECYSTECTOMY, LAPOROSCOPIC  1980's    Cholecystectomy, Laparoscopic     HERNIORRHAPHY VENTRAL N/A 4/28/2016    Procedure: HERNIORRHAPHY VENTRAL;  Surgeon: Igor Mcnair MD;  Location: [facility]     LAPAROSCOPIC APPENDECTOMY N/A 6/18/2015    Procedure: LAPAROSCOPIC APPENDECTOMY;   Surgeon: Igor Mcnair MD;  Location:  OR     LAPAROSCOPIC ASSISTED COLECTOMY LEFT (DESCENDING) Left 2015    Procedure: LAPAROSCOPIC ASSISTED COLECTOMY LEFT (DESCENDING);  Surgeon: Igor Mcnair MD;  Location:  OR     LAPAROSCOPIC HERNIORRHAPHY INGUINAL BILATERAL Bilateral 2016    Procedure: LAPAROSCOPIC HERNIORRHAPHY INGUINAL BILATERAL;  Surgeon: Igor Mcnair MD;  [facility]     LAPAROSCOPIC HERNIORRHAPHY VENTRAL N/A 2015    Procedure: LAPAROSCOPIC HERNIORRHAPHY VENTRAL;  Surgeon: Igor Mcnair MD;  Location:  OR     LAPAROSCOPIC HERNIORRHAPHY VENTRAL N/A 2016    Procedure: LAPAROSCOPIC HERNIORRHAPHY VENTRAL;  Surgeon: Igor Mcnair MD;  [facility]     LAPAROSCOPIC LYSIS ADHESIONS N/A 2015    Procedure: LAPAROSCOPIC LYSIS ADHESIONS;  Surgeon: Igor Mcnair MD;  Location:  OR     PHACOEMULSIFICATION CLEAR CORNEA WITH STANDARD INTRAOCULAR LENS IMPLANT Right 2016    Procedure: PHACOEMULSIFICATION CLEAR CORNEA WITH STANDARD INTRAOCULAR LENS IMPLANT;  Surgeon: Jaylan Mills MD;  [facility]:  [facility]     VARICOCELECTOMY  's     ZZC BOWEL TO BOWEL ANASTOMOSIS         FAMILY HISTORY:    Family History   Problem Relation Age of Onset     Atrial fibrillation Mother      Cerebrovascular Disease Father      Diabetes Maternal Grandmother        SOCIAL HISTORY:    Social History     Socioeconomic History     Marital status:      Spouse name: None     Number of children: None     Years of education: None     Highest education level: None   Tobacco Use     Smoking status: Former Smoker     Packs/day: 1.00     Years: 30.00     Pack years: 30.00     Types: Cigarettes     Quit date: 2001     Years since quittin.6     Smokeless tobacco: Never Used   Substance and Sexual Activity     Alcohol use: No     Alcohol/week: 0.0 standard drinks     Comment: quit oct 1980     Drug use: Never     Sexual activity: Yes          CC  REFERRING PROVIDER:  Marvel De La Fuente MD  [address] [place] [address]

## 2022-06-17 NOTE — PROGRESS NOTES
"Service Date: 06/17/2022    PRIMARY AND REFERRING PROVIDER:  Dr. Marvel De La Fuente    REASONS FOR VISIT:    1.  New-onset dyspnea on exertion.  2.  Question of atrial fibrillation.    HISTORY OF PRESENT ILLNESS:    Chemo Millard is new to Cardiology.  He is a 72-year-old  gentleman, retired, very tall at 6 feet 5 inches with a BMI of 27 kg/m2 with stable depression, former ethanol abuse (quit several years ago), former tobacco user (30 pack years, quit in 2001), stage III CKD, untreated hyperlipidemia (per patient preference).    The patient went to see Dr. De La Fuente recently reporting shortness of breath.  He had a COVID infection a year and a half ago but after that, returned to his longstanding exercise regimen of swimming a mile and a half most days of the week.  However, for the last 3-4 weeks, he has been short of breath with his accustomed swimming and also walking up a couple flights of stairs.  No symptoms at rest.  No chest pain, leg swelling, orthopnea, PND.    His other symptom is that of \"pounding of the chest which I've had for several years.\"  He occasionally has orthostatic dizziness, is not on any blood pressure-lowering medications.    At his doctor's office, an ECG was done which I personally reviewed.  The computer-generated report had said atrial fibrillation with slow ventricular response with a competing junctional pacemaker.  Per my review, this is sinus bradycardia with premature atrial complexs.    Dr. De La Fuente ordered a heart monitor, transesophageal echocardiogram (I wonder if this was done in error and he meant a transthoracic) and Cardiology referral.  The patient has not scheduled testing yet because he preferred a cardiology consult first.    RISK FACTORS:  Age 72 years, former tobacco user, family history of stroke in father and atrial fibrillation in mother, untreated hyperlipidemia (total cholesterol 234, HDL 46, , triglycerides 209 (tried a statin decades ago and it " caused him to have flu-like symptoms and he has declined all statins since) and stage IIIA CKD with a creatinine of 1.3.    DIAGNOSTIC DATA:    ECG reviewed as above.    LABORATORIES:  As above.  In addition, his CBC is normal.  Potassium 4.7.    I reviewed his previous exercise echocardiogram done in 2018.  The indications are dyspnea on exertion but the patient does not recall having symptoms then.  He had above-average exercise capacity of 10 METs, stress was negative for ischemia but he had a hypertensive blood pressure response with a peak BP of 228/80 mmHg.    PHYSICAL EXAMINATION:  Detailed exam attached to this note.  VITAL SIGNS:  /82, pulse 51 per minute and regular, height 6 feet 5 inches, weight 226 pounds, BMI 28 kg/m2.  Sats 97% on room air.  CARDIOVASCULAR:  Regular heart sounds, undisplaced apical impulse, normal JVP.  No murmur, no pericardial friction rub.    RESPIRATORY:  Lungs are clear to auscultation without rales or wheeze.    EXTREMITIES:  No edema.  ABDOMEN:  Soft, nontender, no hepatosplenomegaly.    DIAGNOSES:    1.  New diagnosis of benign essential hypertension with hypertensive response to exercise.  2.  New diagnosis of exertional dyspnea over the last 3 weeks.  3.  Question of atrial fibrillation.  4.  Sinus bradycardia.  5.  Hyperlipidemia and lipid-lowering therapy counseling.  6.  Former tobacco user.  7.  Stage IIIA chronic kidney disease.      ASSESSMENT:    Chemo clearly has new-onset exertional dyspnea with accustomed exercise over the last 3 weeks.  On review of his previous stress echo, I note a hypertensive response to exercise and I wonder if this is the problem.  He also has chronic kidney disease without obvious etiology, and this may be due to unrecognized hypertension. I will empirically start him on antihypertensive therapy and obtain repeat stress testing.  He has risk factors and coronary artery disease needs to be ruled out.      I reviewed his ECG and do not  think it is atrial fibrillation.  We will get a heart monitor for confirmation.  I think the sinus bradycardia is due to his good physiological status and regular exercise regimen.    PLAN:    1.  Start losartan 25 mg daily.  Side effect profile discussed.  New prescription sent.  Check basic metabolic panel in 3-4 weeks on followup.  2.  Talked about statin therapy.  I think we should target him to a goal of 70 or below based on his risk factors.  He did not want to address that today.  If he changes his mind, I recommend rosuvastatin 5 mg daily and up-titrate as tolerated.  3.  7-day heart monitor.  4.  Stress exercise echocardiogram.  5.  Transesophageal echocardiogram not indicated and should be canceled.  6.  Basic metabolic panel and follow up with Cardiology TONYA.  7.  Addressed the patient's frustrations over the various tests including the NARESH ordered by primary team and helped him with his health-related anxiety with compassionate counseling.  8.  Detailed visit summary provided.    TOTAL TIME TODAY:  60 minutes.    Fermin Wahl MD        D: 2022   T: 2022   MT: hero    Name:     DAFNE ALFAROSierra  MRN:      [id number]        Account:      [id number]   :      1950           Service Date: 2022       Document: [id number]

## 2022-06-24 ENCOUNTER — HOSPITAL ENCOUNTER (OUTPATIENT)
Dept: CARDIOLOGY | Facility: CLINIC | Age: 72
Discharge: HOME | End: 2022-06-24
Attending: INTERNAL MEDICINE | Admitting: INTERNAL MEDICINE
Payer: MEDICARE

## 2022-06-24 DIAGNOSIS — R00.2 PALPITATIONS: ICD-10-CM

## 2022-06-24 DIAGNOSIS — I10 BENIGN ESSENTIAL HYPERTENSION: ICD-10-CM

## 2022-06-24 DIAGNOSIS — R06.09 DYSPNEA ON EXERTION: ICD-10-CM

## 2022-06-24 DIAGNOSIS — R00.1 BRADYCARDIA: ICD-10-CM

## 2022-06-24 PROCEDURE — 93244 EXT ECG>48HR<7D REV&INTERPJ: CPT | Performed by: INTERNAL MEDICINE

## 2022-06-24 PROCEDURE — 93242 EXT ECG>48HR<7D RECORDING: CPT

## 2022-07-11 ENCOUNTER — TELEPHONE (OUTPATIENT)
Dept: CARDIOLOGY | Facility: CLINIC | Age: 72
End: 2022-07-11

## 2022-07-11 NOTE — TELEPHONE ENCOUNTER
Dr. ortiz's reply -   1.  Yes, lets proceed with all the testing including stress echo.   2.  The pauses occurred when he was likely asleep, so most probably physiologic bradycardia.  He is a regular swimmer.  Does not explain his exertional dyspnea when he walks.   3.  TONYA can review test results with him.

## 2022-07-11 NOTE — TELEPHONE ENCOUNTER
3-day ziopatch report 7/11/2022 noted. Ordered to rule out atrial fibrillation and document sinus bradycardia. patient initially seen for SOB with activity.    Patient scheduled for stress echo on 7/13/2022, bmp and BNP on 7/21/2022 and return visit with TONYA Suzy Mason on 7/21/2022.    Ziopatch: sinus with average heart rate 63 BPM, range  BPM. 1 run SVT of 6 beats @ 11 BPM.  11 pauses with longest up to 4 seconds.   Longest pause of 4 second was documented on 6/26/2022 at 12:01 AM.  2nd longest pause of 3.5 seconds was on 6/26/2022 at 08:00 AM.  3rd longest pause of 3.4 seconds was on 6/26/2022 at 01:41 AM.  All 3 strips were auto recorded, not patient triggered. No atrial fibrillation or AVB noted.    Will message Dr. Wahl to review

## 2022-07-13 ENCOUNTER — HOSPITAL ENCOUNTER (OUTPATIENT)
Dept: CARDIOLOGY | Facility: CLINIC | Age: 72
Discharge: HOME | End: 2022-07-13
Attending: INTERNAL MEDICINE | Admitting: INTERNAL MEDICINE
Payer: MEDICARE

## 2022-07-13 DIAGNOSIS — R00.2 PALPITATIONS: ICD-10-CM

## 2022-07-13 DIAGNOSIS — R06.09 DYSPNEA ON EXERTION: ICD-10-CM

## 2022-07-13 DIAGNOSIS — I10 BENIGN ESSENTIAL HYPERTENSION: ICD-10-CM

## 2022-07-13 DIAGNOSIS — R00.1 BRADYCARDIA: ICD-10-CM

## 2022-07-13 PROCEDURE — 255N000002 HC RX 255 OP 636: Performed by: INTERNAL MEDICINE

## 2022-07-13 PROCEDURE — 93350 STRESS TTE ONLY: CPT | Mod: 26 | Performed by: INTERNAL MEDICINE

## 2022-07-13 PROCEDURE — 93018 CV STRESS TEST I&R ONLY: CPT | Performed by: INTERNAL MEDICINE

## 2022-07-13 PROCEDURE — 93321 DOPPLER ECHO F-UP/LMTD STD: CPT | Mod: 26 | Performed by: INTERNAL MEDICINE

## 2022-07-13 PROCEDURE — 93325 DOPPLER ECHO COLOR FLOW MAPG: CPT | Mod: TC

## 2022-07-13 PROCEDURE — 93325 DOPPLER ECHO COLOR FLOW MAPG: CPT | Mod: 26 | Performed by: INTERNAL MEDICINE

## 2022-07-13 PROCEDURE — 93016 CV STRESS TEST SUPVJ ONLY: CPT | Performed by: INTERNAL MEDICINE

## 2022-07-13 RX ADMIN — HUMAN ALBUMIN MICROSPHERES AND PERFLUTREN 9 ML: 10; .22 INJECTION, SOLUTION INTRAVENOUS at 09:36

## 2022-07-18 ENCOUNTER — OFFICE VISIT (OUTPATIENT)
Dept: FAMILY MEDICINE | Facility: CLINIC | Age: 72
End: 2022-07-18

## 2022-07-18 VITALS
BODY MASS INDEX: 27.52 KG/M2 | OXYGEN SATURATION: 95 % | HEART RATE: 51 BPM | TEMPERATURE: 97.1 F | HEIGHT: 76 IN | WEIGHT: 226 LBS | DIASTOLIC BLOOD PRESSURE: 78 MMHG | RESPIRATION RATE: 16 BRPM | SYSTOLIC BLOOD PRESSURE: 126 MMHG

## 2022-07-18 DIAGNOSIS — R21 RASH AND NONSPECIFIC SKIN ERUPTION: Primary | ICD-10-CM

## 2022-07-18 DIAGNOSIS — L29.9 ITCHING: ICD-10-CM

## 2022-07-18 PROBLEM — Z86.0100 HISTORY OF COLONIC POLYPS: Status: ACTIVE | Noted: 2019-05-09

## 2022-07-18 PROCEDURE — 99213 OFFICE O/P EST LOW 20 MIN: CPT | Performed by: NURSE PRACTITIONER

## 2022-07-18 RX ORDER — VALACYCLOVIR HYDROCHLORIDE 1 G/1
1000 TABLET, FILM COATED ORAL 3 TIMES DAILY
Qty: 21 TABLET | Refills: 0 | Status: SHIPPED | OUTPATIENT
Start: 2022-07-18 | End: 2023-01-06

## 2022-07-18 NOTE — PROGRESS NOTES
"Problem(s) Oriented visit        SUBJECTIVE:                                                    Felix Millard is a 72 year old male who presents to clinic today for the following health issues :    Rash on left side started 2 days ago. Has itching in the area but no pain. Looks red, scabbed but no blistering. Had shingles back in 2009 and got first Shingles vaccine. Worried that this could be shingles since his dad had debilitating case.     Problem list, Medication list, Allergies, and Medical/Social/Surgical histories reviewed in [facility] and updated as appropriate.   Additional history: as documented    ROS:  2 point ROS completed and negative except noted above, including Gen, Skin    OBJECTIVE:                                                    /78   Pulse 51   Temp 97.1  F (36.2  C) (Temporal)   Resp 16   Ht 1.93 m (6' 4\")   Wt 102.5 kg (226 lb)   SpO2 95%   BMI 27.51 kg/m    Body mass index is 27.51 kg/m .   GENERAL: healthy, alert and no distress  SKIN: small area of erythema and scabbing on left side - no vesicles, papules, pustules         ASSESSMENT/PLAN:                                                      Felix was seen today for shingles.    Diagnoses and all orders for this visit:    Rash and nonspecific skin eruption  -     valACYclovir (VALTREX) 1000 mg tablet; Take 1 tablet (1,000 mg) by mouth 3 times daily for 7 days    Itching    Does not appear to be consistent with herpes zoster appearance/symptoms. Unknown cause of pruritic dermatitis. Recommend OTC hydrocortisone cream. Written prescription for Valtrex given to patient to fill if rash appears vesicular or worsens in next couple days.    See Patient Instructions  Patient Instructions   If rash worsens in next couple days, then fill prescription for Valtrex (antiviral) and start taking 1 pill 3 times daily for 7 days.     Apply hydrocortisone cream to rash 3 times daily. Could also try benadryl cream for itching      Dorota " JAY Ojeda CNP  [facility]  [phone]    For any issues my office # is [phone]

## 2022-07-18 NOTE — PATIENT INSTRUCTIONS
If rash worsens in next couple days, then fill prescription for Valtrex (antiviral) and start taking 1 pill 3 times daily for 7 days.     Apply hydrocortisone cream to rash 3 times daily. Could also try benadryl cream for itching

## 2022-07-21 ENCOUNTER — OFFICE VISIT (OUTPATIENT)
Dept: CARDIOLOGY | Facility: CLINIC | Age: 72
End: 2022-07-21
Payer: MEDICARE

## 2022-07-21 ENCOUNTER — LAB (OUTPATIENT)
Dept: LAB | Facility: CLINIC | Age: 72
End: 2022-07-21
Payer: MEDICARE

## 2022-07-21 VITALS
WEIGHT: 224 LBS | DIASTOLIC BLOOD PRESSURE: 62 MMHG | HEIGHT: 76 IN | HEART RATE: 54 BPM | BODY MASS INDEX: 27.28 KG/M2 | SYSTOLIC BLOOD PRESSURE: 115 MMHG

## 2022-07-21 DIAGNOSIS — R00.1 BRADYCARDIA: ICD-10-CM

## 2022-07-21 DIAGNOSIS — R00.2 PALPITATIONS: ICD-10-CM

## 2022-07-21 DIAGNOSIS — I10 BENIGN ESSENTIAL HYPERTENSION: ICD-10-CM

## 2022-07-21 DIAGNOSIS — R06.09 DYSPNEA ON EXERTION: ICD-10-CM

## 2022-07-21 LAB
ANION GAP SERPL CALCULATED.3IONS-SCNC: 8 MMOL/L (ref 3–14)
BUN SERPL-MCNC: 29 MG/DL (ref 7–30)
CALCIUM SERPL-MCNC: 8.6 MG/DL (ref 8.5–10.1)
CHLORIDE BLD-SCNC: 108 MMOL/L (ref 94–109)
CO2 SERPL-SCNC: 24 MMOL/L (ref 20–32)
CREAT SERPL-MCNC: 1.25 MG/DL (ref 0.66–1.25)
GFR SERPL CREATININE-BSD FRML MDRD: 60 ML/MIN/1.73M2
GLUCOSE BLD-MCNC: 132 MG/DL (ref 70–99)
NT-PROBNP SERPL-MCNC: 237 PG/ML (ref 0–900)
POTASSIUM BLD-SCNC: 4.7 MMOL/L (ref 3.4–5.3)
SODIUM SERPL-SCNC: 140 MMOL/L (ref 133–144)

## 2022-07-21 PROCEDURE — 83880 ASSAY OF NATRIURETIC PEPTIDE: CPT | Performed by: INTERNAL MEDICINE

## 2022-07-21 PROCEDURE — 80048 BASIC METABOLIC PNL TOTAL CA: CPT | Performed by: INTERNAL MEDICINE

## 2022-07-21 PROCEDURE — 99214 OFFICE O/P EST MOD 30 MIN: CPT | Performed by: NURSE PRACTITIONER

## 2022-07-21 PROCEDURE — 36415 COLL VENOUS BLD VENIPUNCTURE: CPT | Performed by: INTERNAL MEDICINE

## 2022-07-21 RX ORDER — LOSARTAN POTASSIUM 25 MG/1
12.5 TABLET ORAL DAILY
Qty: 45 TABLET | Refills: 1 | Status: SHIPPED | OUTPATIENT
Start: 2022-07-21 | End: 2023-04-13

## 2022-07-21 NOTE — PATIENT INSTRUCTIONS
Today's Recommendations    Reduce your losartan to 12.5mg daily (half of a 25mg tablet)   Make an appointment with Dr. De La Fuente to discuss a pulmonary work up   Please follow up with me in 4-6 weeks.    Please send a [employer] message or call [phone] to the RN team with questions or concerns.     Scheduling number [phone]  JAY Jackson, CNP

## 2022-07-21 NOTE — PROGRESS NOTES
Cardiology Clinic Progress Note  Felix Millard MRN# [id number]   YOB: 1950 Age: 72 year old   Primary Cardiologist: Dr. Wahl Reason for visit: Dyspnea on exertion            Assessment and Plan:     1. Dyspnea on exertion  -July 2022 stress echocardiogram negative for ischemia, and zio patch unremarkable   -Symptoms improved while swimming since start of losartan however patient has had an increase in orthostasis. Continues to have MARTIN when going up multiple flights of stairs. He is concerned about falling, BP borderline low on initial clinic reading today, will reduce losartan to 12.5mg daily     2.  Physiologic bradycardia, palpitations  -Zio patch showed 11 sinus pauses up to 4 seconds, episodes occurred while patient was sleeping, and felt to be physiologic bradycardia  -Avoid Beta blockers  -Continues to feel palpitations at HS (11:00pm), not correlated on zio patch monitor  -Counseled regarding reducing caffeine and personal stress as both can contribute to palpitations     3.  Stage III CKD, baseline creatinine 1.3  -July 2022 BMP with stable renal function Cr 1.25, GFR 60    4.  Hyperlipidemia  -Lipid panel Dec 2021 with tchol 234, HDL 46, , trig 209  -Not currently treated, patient preference (trialed statin decades ago and caused flu-like symptoms)  -Need to revisit at future visit, recommended rosuvastatin 5mg daily, goal of LDL <70    Changes today: Reduce losartan to 12.5mg daily    Follow up plan: Patient to make an appointment with his PCP Dr. De La Fuente to discuss a pulmonary work up, Follow up in 4-6 weeks for a recheck of blood pressure.   I will discuss with Dr. Wahl if any further ischemic work up (CTA) is indicated based on risk factors and results of testing up to this point.         History of Presenting Illness:    Felix Millard is a very pleasant 72 year old male with a history of depression, former alcohol abuse (quit in 1980), former tobacco use (quit 21 years ago, 30  "pack years), stage III CKD, untreated hyperlipidemia.    He was seen by Dr. Wahl in June 2022 reporting a new onset of shortness of breath and concerns for atrial fibrillation.  He had a COVID infection a year and half ago but after that was able to return to his usual exercise regimen.  In May 2022 he was experiencing increased shortness of breath while exercising and walking upstairs. No symptoms at rest.  He had a previous exercise stress test in 2018 but was not having symptoms at that time, he had an above average exercise capacity of 10 METS stress test was negative for ischemia however he had a hypertensive blood pressure response. EKG done during PCP clinic visit was reviewed by Dr. Wahl and showed sinus bradycardia with PACs.     Patient is here today to review the testing completed following his visit last month. He has not had the shortness of breath with swimming in the last 2 weeks or so. He has had orthostasis when getting up in the morning or getting up from standing, worsened since starting losartan. Patient denies chest pain or chest tightness. Denies lightheadedness or other presyncopal symptoms. Denies tachycardia. At night he feels his \"chest pounding\" when he lays down every night.     His wife has had mobility issues and he is now responsible for more household chores, which is stressful for him. He drinks 1-2 cups of coffee and 2-3 caffeinated sodas. He feels that he is generally \"slowing down\".     Blood pressure 101/56 and HR 54 in clinic today. Recheck of blood pressure was 115/62.     We reviewed the results of his 3-day Zio patch which showed 11 pauses up to 4 seconds and 1 episode of 4 beats SVT. Heart rate low of 33. Patient did not trigger for any symptoms during any of these events as they occurred overnight. No correlation to symptoms felt at  He also completed an exercise echocardiogram which was negative for myocardial ischemia.  He had average exercise capacity of 7 METS. Normal " "blood pressure response and no arrhythmias.    He had a BNP today which was normal at 237 and BMP with Na 140, K 4.7, Cr 1.25, and GFR 60        Recent Hospitalizations   No recent hospitalizations in           Social History      Social History     Socioeconomic History     Marital status:      Spouse name: Not on file     Number of children: Not on file     Years of education: Not on file     Highest education level: Not on file   Occupational History     Not on file   Tobacco Use     Smoking status: Former Smoker     Packs/day: 1.00     Years: 30.00     Pack years: 30.00     Types: Cigarettes     Quit date: 2001     Years since quittin.7     Smokeless tobacco: Never Used   Substance and Sexual Activity     Alcohol use: No     Alcohol/week: 0.0 standard drinks     Comment: quit oct 1980     Drug use: Never     Sexual activity: Yes   Other Topics Concern     Parent/sibling w/ CABG, MI or angioplasty before 65F 55M? Not Asked   Social History Narrative     Not on file     Social Determinants of Health     Financial Resource Strain: Not on file   Food Insecurity: Not on file   Transportation Needs: Not on file   Physical Activity: Not on file   Stress: Not on file   Social Connections: Not on file   Intimate Partner Violence: Not on file   Housing Stability: Not on file            Review of Systems:   Skin:  not assessed     Eyes:  not assessed    ENT:  not assessed    Respiratory:  Negative    Cardiovascular:    Positive for;fatigue  Gastroenterology: not assessed    Genitourinary:  not assessed    Musculoskeletal:  not assessed    Neurologic:  not assessed    Psychiatric:  not assessed    Heme/Lymph/Imm:  Positive for allergies  Endocrine:  Negative           Physical Exam:   Vitals: /56 (BP Location: Left arm, Cuff Size: Adult Large)   Pulse 54   Ht 1.93 m (6' 4\")   Wt 101.6 kg (224 lb)   BMI 27.27 kg/m     Wt Readings from Last 4 Encounters:   22 101.6 kg (224 lb)   22 " 102.5 kg (226 lb)   06/17/22 102.5 kg (226 lb)   06/10/22 101 kg (222 lb 9.6 oz)     GEN: well nourished, in no acute distress.  HEENT:  Pupils equal, round. Sclerae nonicteric.   NECK: Supple, no masses appreciated. No JVD with patient upright, no carotid bruit.  C/V:  Regular rate and rhythm, no murmur, rub or gallop.    RESP: Respirations are unlabored. Clear to auscultation bilaterally without wheezing, rales, or rhonchi.  EXTREM: No LE edema.  NEURO: Alert and oriented, cooperative.  SKIN: Warm and dry.        Data:     LIPID RESULTS:  Lab Results   Component Value Date    CHOL 234 (H) 12/10/2021    HDL 46 12/10/2021     (H) 12/10/2021    TRIG 209 (H) 12/10/2021     LIVER ENZYME RESULTS:  Lab Results   Component Value Date    AST 23 06/10/2022    ALT 24 06/10/2022     CBC RESULTS:  Lab Results   Component Value Date    WBC 7.5 06/10/2022    WBC 8.2 05/25/2013    RBC 4.81 06/10/2022    RBC 4.79 05/25/2013    HGB 15.4 06/10/2022    HCT 44.4 06/10/2022    MCV 92.1 06/10/2022    MCH 32.1 (A) 06/10/2022    MCHC 34.8 06/10/2022    RDW 12.8 05/25/2013     06/10/2022     BMP RESULTS:  Lab Results   Component Value Date     07/21/2022     06/10/2022    POTASSIUM 4.7 07/21/2022    POTASSIUM 4.7 06/10/2022    CHLORIDE 108 07/21/2022    CHLORIDE 106 06/10/2022    CO2 24 07/21/2022    CO2 24 06/22/2015    ANIONGAP 8 07/21/2022    ANIONGAP 9 06/22/2015     (H) 07/21/2022     (H) 06/10/2022    BUN 29 07/21/2022    BUN 22 06/10/2022    BUN 16 06/10/2022    CR 1.25 07/21/2022    CR 1.35 (H) 06/10/2022    GFRESTIMATED 60 (L) 07/21/2022    GFRESTIMATED 81 06/24/2015    GFRESTBLACK >90   GFR Calc   06/24/2015    JOSE ARMANDO 8.6 07/21/2022    JOSE ARMANDO 9.4 06/10/2022      A1C RESULTS:  No results found for: A1C  INR RESULTS:  No results found for: INR         Medications     Current Outpatient Medications   Medication Sig Dispense Refill     Aspirin-Acetaminophen-Caffeine (EXCEDRIN EXTRA  STRENGTH PO) Take 1 tablet by mouth 2 times daily as needed        buPROPion (WELLBUTRIN SR) 150 MG 12 hr tablet Take 150 mg by mouth daily.       escitalopram (LEXAPRO) 20 MG tablet Take 20 mg by mouth       losartan (COZAAR) 25 MG tablet Take 1 tablet (25 mg) by mouth daily 30 tablet 4     Methylphenidate HCl (RITALIN PO) Take 20 mg by mouth 2 times daily Reported on 2/22/2017       Multiple Vitamins-Minerals (CENTRUM PO) Take 1 tablet by mouth daily Reported on 2/22/2017       VITAMIN D, CHOLECALCIFEROL, PO Take 1,000 Units by mouth daily       valACYclovir (VALTREX) 1000 mg tablet Take 1 tablet (1,000 mg) by mouth 3 times daily for 7 days (Patient not taking: Reported on 7/21/2022) 21 tablet 0          Past Medical History     Past Medical History:   Diagnosis Date     ADD (attention deficit disorder)      COVID-19 12/2020    Occasional Dyspea     Depressive disorder, not elsewhere classified      Diverticulosis      Eczema      History of shingles      Hyperlipidaemia LDL goal < 100      Major depression in complete remission (H) 8/15/2016     Major depression in complete remission (H) 8/15/2016     Seasonal allergies      Tinnitus      Past Surgical History:   Procedure Laterality Date     CHOLECYSTECTOMY, LAPOROSCOPIC  1980's    Cholecystectomy, Laparoscopic     HERNIORRHAPHY VENTRAL N/A 4/28/2016    Procedure: HERNIORRHAPHY VENTRAL;  Surgeon: Igor Mcnair MD;  Location: [facility]     LAPAROSCOPIC APPENDECTOMY N/A 6/18/2015    Procedure: LAPAROSCOPIC APPENDECTOMY;  Surgeon: Igor Mcnair MD;  Location:  OR     LAPAROSCOPIC ASSISTED COLECTOMY LEFT (DESCENDING) Left 6/18/2015    Procedure: LAPAROSCOPIC ASSISTED COLECTOMY LEFT (DESCENDING);  Surgeon: Igor Mcnair MD;  Location:  OR     LAPAROSCOPIC HERNIORRHAPHY INGUINAL BILATERAL Bilateral 4/28/2016    Procedure: LAPAROSCOPIC HERNIORRHAPHY INGUINAL BILATERAL;  Surgeon: Igor Mcnair MD;  Location: [facility]     LAPAROSCOPIC  HERNIORRHAPHY VENTRAL N/A 6/18/2015    Procedure: LAPAROSCOPIC HERNIORRHAPHY VENTRAL;  Surgeon: Igor Mcnair MD;  Location:  OR     LAPAROSCOPIC HERNIORRHAPHY VENTRAL N/A 4/28/2016    Procedure: LAPAROSCOPIC HERNIORRHAPHY VENTRAL;  Surgeon: Igor Mcnair MD;  Location:  [facility]     LAPAROSCOPIC LYSIS ADHESIONS N/A 6/18/2015    Procedure: LAPAROSCOPIC LYSIS ADHESIONS;  Surgeon: Igor Mcnair MD;  Location:  OR     PHACOEMULSIFICATION CLEAR CORNEA WITH STANDARD INTRAOCULAR LENS IMPLANT Right 8/22/2016    Procedure: PHACOEMULSIFICATION CLEAR CORNEA WITH STANDARD INTRAOCULAR LENS IMPLANT;  Surgeon: Jaylan Mills MD;  Location:  [facility]     VARICOCELECTOMY  1950's     ZZC BOWEL TO BOWEL ANASTOMOSIS       Family History   Problem Relation Age of Onset     Atrial fibrillation Mother      Cerebrovascular Disease Father      Diabetes Maternal Grandmother             Allergies   Gluten meal, Augmentin, Ciprofloxacin, Wheat bran, and Oxycodone        Ivonne Mason NP  [facility]  Pager: [phone]

## 2022-07-21 NOTE — LETTER
7/21/2022    Marvel De La Fuente MD  [address]  [facility] [id number]    RE: Felix R Freeman       Dear Colleague,     I had the pleasure of seeing Felix Millard in the [facility].  Cardiology Clinic Progress Note  Felix Millard MRN# [id number]   YOB: 1950 Age: 72 year old   Primary Cardiologist: Dr. Wahl Reason for visit: Dyspnea on exertion            Assessment and Plan:     1. Dyspnea on exertion  -July 2022 stress echocardiogram negative for ischemia, and zio patch unremarkable   -Symptoms improved while swimming since start of losartan however patient has had an increase in orthostasis. Continues to have MARTIN when going up multiple flights of stairs. He is concerned about falling, BP borderline low on initial clinic reading today, will reduce losartan to 12.5mg daily     2.  Physiologic bradycardia, palpitations  -Zio patch showed 11 sinus pauses up to 4 seconds, episodes occurred while patient was sleeping, and felt to be physiologic bradycardia  -Avoid Beta blockers  -Continues to feel palpitations at HS (11:00pm), not correlated on zio patch monitor  -Counseled regarding reducing caffeine and personal stress as both can contribute to palpitations     3.  Stage III CKD, baseline creatinine 1.3  -July 2022 BMP with stable renal function Cr 1.25, GFR 60    4.  Hyperlipidemia  -Lipid panel Dec 2021 with tchol 234, HDL 46, , trig 209  -Not currently treated, patient preference (trialed statin decades ago and caused flu-like symptoms)  -Need to revisit at future visit, recommended rosuvastatin 5mg daily, goal of LDL <70    Changes today: Reduce losartan to 12.5mg daily    Follow up plan: Patient to make an appointment with his PCP Dr. De La Fuente to discuss a pulmonary work up, Follow up in 4-6 weeks for a recheck of blood pressure.   I will discuss with Dr. Wahl if any further ischemic work up (CTA) is indicated based on risk factors and results of testing up to  "this point.         History of Presenting Illness:    Felix Millard is a very pleasant 72 year old male with a history of depression, former alcohol abuse (quit in 1980), former tobacco use (quit 21 years ago, 30 pack years), stage III CKD, untreated hyperlipidemia.    He was seen by Dr. Wahl in June 2022 reporting a new onset of shortness of breath and concerns for atrial fibrillation.  He had a COVID infection a year and half ago but after that was able to return to his usual exercise regimen.  In May 2022 he was experiencing increased shortness of breath while exercising and walking upstairs. No symptoms at rest.  He had a previous exercise stress test in 2018 but was not having symptoms at that time, he had an above average exercise capacity of 10 METS stress test was negative for ischemia however he had a hypertensive blood pressure response. EKG done during PCP clinic visit was reviewed by Dr. Wahl and showed sinus bradycardia with PACs.     Patient is here today to review the testing completed following his visit last month. He has not had the shortness of breath with swimming in the last 2 weeks or so. He has had orthostasis when getting up in the morning or getting up from standing, worsened since starting losartan. Patient denies chest pain or chest tightness. Denies lightheadedness or other presyncopal symptoms. Denies tachycardia. At night he feels his \"chest pounding\" when he lays down every night.     His wife has had mobility issues and he is now responsible for more household chores, which is stressful for him. He drinks 1-2 cups of coffee and 2-3 caffeinated sodas. He feels that he is generally \"slowing down\".     Blood pressure 101/56 and HR 54 in clinic today. Recheck of blood pressure was 115/62.     We reviewed the results of his 3-day Zio patch which showed 11 pauses up to 4 seconds and 1 episode of 4 beats SVT. Heart rate low of 33. Patient did not trigger for any symptoms during any of " these events as they occurred overnight. No correlation to symptoms felt at  He also completed an exercise echocardiogram which was negative for myocardial ischemia.  He had average exercise capacity of 7 METS. Normal blood pressure response and no arrhythmias.    He had a BNP today which was normal at 237 and BMP with Na 140, K 4.7, Cr 1.25, and GFR 60        Recent Hospitalizations   No recent hospitalizations in           Social History      Social History     Socioeconomic History     Marital status:      Spouse name: Not on file     Number of children: Not on file     Years of education: Not on file     Highest education level: Not on file   Occupational History     Not on file   Tobacco Use     Smoking status: Former Smoker     Packs/day: 1.00     Years: 30.00     Pack years: 30.00     Types: Cigarettes     Quit date: 2001     Years since quittin.7     Smokeless tobacco: Never Used   Substance and Sexual Activity     Alcohol use: No     Alcohol/week: 0.0 standard drinks     Comment: quit oct 1980     Drug use: Never     Sexual activity: Yes   Other Topics Concern     Parent/sibling w/ CABG, MI or angioplasty before 65F 55M? Not Asked   Social History Narrative     Not on file     Social Determinants of Health     Financial Resource Strain: Not on file   Food Insecurity: Not on file   Transportation Needs: Not on file   Physical Activity: Not on file   Stress: Not on file   Social Connections: Not on file   Intimate Partner Violence: Not on file   Housing Stability: Not on file            Review of Systems:   Skin:  not assessed     Eyes:  not assessed    ENT:  not assessed    Respiratory:  Negative    Cardiovascular:    Positive for;fatigue  Gastroenterology: not assessed    Genitourinary:  not assessed    Musculoskeletal:  not assessed    Neurologic:  not assessed    Psychiatric:  not assessed    Heme/Lymph/Imm:  Positive for allergies  Endocrine:  Negative           Physical Exam:  "  Vitals: /56 (BP Location: Left arm, Cuff Size: Adult Large)   Pulse 54   Ht 1.93 m (6' 4\")   Wt 101.6 kg (224 lb)   BMI 27.27 kg/m     Wt Readings from Last 4 Encounters:   07/21/22 101.6 kg (224 lb)   07/18/22 102.5 kg (226 lb)   06/17/22 102.5 kg (226 lb)   06/10/22 101 kg (222 lb 9.6 oz)     GEN: well nourished, in no acute distress.  HEENT:  Pupils equal, round. Sclerae nonicteric.   NECK: Supple, no masses appreciated. No JVD with patient upright, no carotid bruit.  C/V:  Regular rate and rhythm, no murmur, rub or gallop.    RESP: Respirations are unlabored. Clear to auscultation bilaterally without wheezing, rales, or rhonchi.  EXTREM: No LE edema.  NEURO: Alert and oriented, cooperative.  SKIN: Warm and dry.        Data:     LIPID RESULTS:  Lab Results   Component Value Date    CHOL 234 (H) 12/10/2021    HDL 46 12/10/2021     (H) 12/10/2021    TRIG 209 (H) 12/10/2021     LIVER ENZYME RESULTS:  Lab Results   Component Value Date    AST 23 06/10/2022    ALT 24 06/10/2022     CBC RESULTS:  Lab Results   Component Value Date    WBC 7.5 06/10/2022    WBC 8.2 05/25/2013    RBC 4.81 06/10/2022    RBC 4.79 05/25/2013    HGB 15.4 06/10/2022    HCT 44.4 06/10/2022    MCV 92.1 06/10/2022    MCH 32.1 (A) 06/10/2022    MCHC 34.8 06/10/2022    RDW 12.8 05/25/2013     06/10/2022     BMP RESULTS:  Lab Results   Component Value Date     07/21/2022     06/10/2022    POTASSIUM 4.7 07/21/2022    POTASSIUM 4.7 06/10/2022    CHLORIDE 108 07/21/2022    CHLORIDE 106 06/10/2022    CO2 24 07/21/2022    CO2 24 06/22/2015    ANIONGAP 8 07/21/2022    ANIONGAP 9 06/22/2015     (H) 07/21/2022     (H) 06/10/2022    BUN 29 07/21/2022    BUN 22 06/10/2022    BUN 16 06/10/2022    CR 1.25 07/21/2022    CR 1.35 (H) 06/10/2022    GFRESTIMATED 60 (L) 07/21/2022    GFRESTIMATED 81 06/24/2015    GFRESTBLACK >90   GFR Calc   06/24/2015    JOSE ARMANDO 8.6 07/21/2022    JOSE ARMANDO 9.4 06/10/2022    "   A1C RESULTS:  No results found for: A1C  INR RESULTS:  No results found for: INR         Medications     Current Outpatient Medications   Medication Sig Dispense Refill     Aspirin-Acetaminophen-Caffeine (EXCEDRIN EXTRA STRENGTH PO) Take 1 tablet by mouth 2 times daily as needed        buPROPion (WELLBUTRIN SR) 150 MG 12 hr tablet Take 150 mg by mouth daily.       escitalopram (LEXAPRO) 20 MG tablet Take 20 mg by mouth       losartan (COZAAR) 25 MG tablet Take 1 tablet (25 mg) by mouth daily 30 tablet 4     Methylphenidate HCl (RITALIN PO) Take 20 mg by mouth 2 times daily Reported on 2/22/2017       Multiple Vitamins-Minerals (CENTRUM PO) Take 1 tablet by mouth daily Reported on 2/22/2017       VITAMIN D, CHOLECALCIFEROL, PO Take 1,000 Units by mouth daily       valACYclovir (VALTREX) 1000 mg tablet Take 1 tablet (1,000 mg) by mouth 3 times daily for 7 days (Patient not taking: Reported on 7/21/2022) 21 tablet 0          Past Medical History     Past Medical History:   Diagnosis Date     ADD (attention deficit disorder)      COVID-19 12/2020    Occasional Dyspea     Depressive disorder, not elsewhere classified      Diverticulosis      Eczema      History of shingles      Hyperlipidaemia LDL goal < 100      Major depression in complete remission (H) 8/15/2016     Major depression in complete remission (H) 8/15/2016     Seasonal allergies      Tinnitus      Past Surgical History:   Procedure Laterality Date     CHOLECYSTECTOMY, LAPOROSCOPIC  1980's    Cholecystectomy, Laparoscopic     HERNIORRHAPHY VENTRAL N/A 4/28/2016    Procedure: HERNIORRHAPHY VENTRAL;  Surgeon: Igor Mcnair MD;  [facility]     LAPAROSCOPIC APPENDECTOMY N/A 6/18/2015    Procedure: LAPAROSCOPIC APPENDECTOMY;  Surgeon: Igor Mcnair MD;  [facility]:  [facility]     LAPAROSCOPIC ASSISTED COLECTOMY LEFT (DESCENDING) Left 6/18/2015    Procedure: LAPAROSCOPIC ASSISTED COLECTOMY LEFT (DESCENDING);  Surgeon: Igor Mcnair MD;   [facility]: [facility]     LAPAROSCOPIC HERNIORRHAPHY INGUINAL BILATERAL Bilateral 4/28/2016    Procedure: LAPAROSCOPIC HERNIORRHAPHY INGUINAL BILATERAL;  Surgeon: Igor Mcnair MD;  [facility]     LAPAROSCOPIC HERNIORRHAPHY VENTRAL N/A 6/18/2015    Procedure: LAPAROSCOPIC HERNIORRHAPHY VENTRAL;  Surgeon: Igor Mcnair MD;  [facility]     LAPAROSCOPIC HERNIORRHAPHY VENTRAL N/A 4/28/2016    Procedure: LAPAROSCOPIC HERNIORRHAPHY VENTRAL;  Surgeon: Igor Mcnair MD;  [facility]     LAPAROSCOPIC LYSIS ADHESIONS N/A 6/18/2015    Procedure: LAPAROSCOPIC LYSIS ADHESIONS;  Surgeon: Igor Mcnair MD;  [facility]     PHACOEMULSIFICATION CLEAR CORNEA WITH STANDARD INTRAOCULAR LENS IMPLANT Right 8/22/2016    Procedure: PHACOEMULSIFICATION CLEAR CORNEA WITH STANDARD INTRAOCULAR LENS IMPLANT;  Surgeon: Jaylan Mills MD;  [facility]     VARICOCELECTOMY  1950's     ZZC BOWEL TO BOWEL ANASTOMOSIS       Family History   Problem Relation Age of Onset     Atrial fibrillation Mother      Cerebrovascular Disease Father      Diabetes Maternal Grandmother             Allergies   Gluten meal, Augmentin, Ciprofloxacin, Wheat bran, and Oxycodone        Ivonne Mason NP  [facility]  Pager: [phone]      Thank you for allowing me to participate in the care of your patient.      Sincerely,     Ivonne Mason NP     [facility]  cc:   Fermin Wahl MD  [address] [place] [id number]

## 2022-07-25 ENCOUNTER — DOCUMENTATION ONLY (OUTPATIENT)
Dept: CARDIOLOGY | Facility: CLINIC | Age: 72
End: 2022-07-25

## 2022-07-25 NOTE — PROGRESS NOTES
I discussed patient visit from last week and testing with Dr. Wahl. She feels his stress test was reassuring that his exertional dyspnea is not cardiac in nature. She does not recommend any further at this time.  She would like him to follow up with his primary provider for his  dyspnea, blood pressure and cholesterol.

## 2022-07-26 NOTE — PROGRESS NOTES
Pt called back, informed of recommendations from Dr. Wahl & Suzy Mason NP. Upcoming appt cancelled & pt was informed. Elpidio SANCHEZ

## 2022-09-21 ENCOUNTER — TELEPHONE (OUTPATIENT)
Dept: FAMILY MEDICINE | Facility: CLINIC | Age: 72
End: 2022-09-21

## 2022-09-21 NOTE — TELEPHONE ENCOUNTER
gave you a Colonoscopy referral in Nov.  We are just wondering if you are planning to go to or have already seen a Colonoscopy?  If you are not planning to go please let us know and if you have already gone please tell us where as we have not received any records from their office.      Thank you,    [facility] Front Office Staff   Caller would like to discuss an/a concern regarding past LEEP procedure. Writer advised caller of callback within 24-72 hours.    Patient Name: Neris Addison  Caller Name: Patient  [facility] of Facility[facility]  Fax Number: [email or web address]  Callback Number: [phone]  Additional Info: Patient is calling to speak with RN regarding concerns after leep procedure. Pt had procedure done past December and states she had some complications after. Now the pt states she's been on her menstrual cycle for about a month but now she is spotting but the spotting only happen when she has a bowl movement. Pt states she noticed the pattern last night but the bleeding has been going on during bowel movements for 3 days. Pt would like to know if the bleeding is happening due to something possibly opening up. Please advise.       Thank you,  Qiana Reza

## 2023-01-06 ENCOUNTER — OFFICE VISIT (OUTPATIENT)
Dept: FAMILY MEDICINE | Facility: CLINIC | Age: 73
End: 2023-01-06

## 2023-01-06 VITALS
HEART RATE: 51 BPM | WEIGHT: 222 LBS | BODY MASS INDEX: 27.02 KG/M2 | SYSTOLIC BLOOD PRESSURE: 114 MMHG | TEMPERATURE: 97.6 F | OXYGEN SATURATION: 96 % | DIASTOLIC BLOOD PRESSURE: 68 MMHG

## 2023-01-06 DIAGNOSIS — K01.1 TOOTH IMPACTION: ICD-10-CM

## 2023-01-06 DIAGNOSIS — F10.21 ALCOHOL DEPENDENCE IN REMISSION (H): ICD-10-CM

## 2023-01-06 DIAGNOSIS — R50.9 FEVER, UNSPECIFIED FEVER CAUSE: Primary | ICD-10-CM

## 2023-01-06 DIAGNOSIS — R52 BODY ACHES: ICD-10-CM

## 2023-01-06 DIAGNOSIS — F33.42 RECURRENT MAJOR DEPRESSIVE DISORDER, IN FULL REMISSION (H): ICD-10-CM

## 2023-01-06 LAB
% GRANULOCYTES: 71 % (ref 42.2–75.2)
HCT VFR BLD AUTO: 45.9 % (ref 39–51)
HEMOGLOBIN: 15.8 G/DL (ref 13.4–17.5)
INFLUENZA A (RMG): NEGATIVE
INFLUENZA B (RMG): NEGATIVE
LYMPHOCYTES NFR BLD AUTO: 22.7 % (ref 20.5–51.1)
MCH RBC QN AUTO: 31.7 PG (ref 27–31)
MCHC RBC AUTO-ENTMCNC: 34.4 G/DL (ref 33–37)
MCV RBC AUTO: 92.2 FL (ref 80–100)
MONOCYTES NFR BLD AUTO: 6.3 % (ref 1.7–9.3)
PLATELET # BLD AUTO: 275 K/UL (ref 140–450)
RBC # BLD AUTO: 4.98 X10/CMM (ref 4.2–5.9)
SARS ANTIGEN (RMG): NEGATIVE
WBC # BLD AUTO: 11 X10/CMM (ref 3.8–11)

## 2023-01-06 PROCEDURE — 85025 COMPLETE CBC W/AUTO DIFF WBC: CPT | Performed by: FAMILY MEDICINE

## 2023-01-06 PROCEDURE — 87428 SARSCOV & INF VIR A&B AG IA: CPT | Mod: QW | Performed by: FAMILY MEDICINE

## 2023-01-06 PROCEDURE — 36415 COLL VENOUS BLD VENIPUNCTURE: CPT | Performed by: FAMILY MEDICINE

## 2023-01-06 PROCEDURE — 99214 OFFICE O/P EST MOD 30 MIN: CPT | Performed by: FAMILY MEDICINE

## 2023-01-06 RX ORDER — CLINDAMYCIN HCL 300 MG
300 CAPSULE ORAL 3 TIMES DAILY
Qty: 30 CAPSULE | Refills: 0 | Status: SHIPPED | OUTPATIENT
Start: 2023-01-06 | End: 2023-09-28

## 2023-01-06 ASSESSMENT — ANXIETY QUESTIONNAIRES
2. NOT BEING ABLE TO STOP OR CONTROL WORRYING: SEVERAL DAYS
1. FEELING NERVOUS, ANXIOUS, OR ON EDGE: SEVERAL DAYS
GAD7 TOTAL SCORE: 6
GAD7 TOTAL SCORE: 6
5. BEING SO RESTLESS THAT IT IS HARD TO SIT STILL: NOT AT ALL
3. WORRYING TOO MUCH ABOUT DIFFERENT THINGS: SEVERAL DAYS
7. FEELING AFRAID AS IF SOMETHING AWFUL MIGHT HAPPEN: NOT AT ALL
6. BECOMING EASILY ANNOYED OR IRRITABLE: MORE THAN HALF THE DAYS

## 2023-01-06 ASSESSMENT — PATIENT HEALTH QUESTIONNAIRE - PHQ9
SUM OF ALL RESPONSES TO PHQ QUESTIONS 1-9: 6
5. POOR APPETITE OR OVEREATING: SEVERAL DAYS

## 2023-01-06 NOTE — PROGRESS NOTES
Since Tuesday - Body aches, headache, congestion  Tooth abscess - dentist late in Dec.   Negative covid two days ago.    Problem(s) Oriented visit      ROS:  General and Resp. completed and negative except as noted above     HISTORY:   reports no history of alcohol use.   reports that he quit smoking about 21 years ago. His smoking use included cigarettes. He has a 30.00 pack-year smoking history. He has never used smokeless tobacco.    Past Medical History:   Diagnosis Date     ADD (attention deficit disorder)      COVID-19 12/2020     Depressive disorder, not elsewhere classified      Diverticulosis      Eczema      History of shingles      Hyperlipidaemia LDL goal < 100      Major depression in complete remission (H) 8/15/2016     Major depression in complete remission (H) 8/15/2016     Seasonal allergies      Tinnitus      Past Surgical History:   Procedure Laterality Date     CHOLECYSTECTOMY, LAPOROSCOPIC  1980's    Cholecystectomy, Laparoscopic     HERNIORRHAPHY VENTRAL N/A 4/28/2016    Procedure: HERNIORRHAPHY VENTRAL;  Surgeon: Igor Mcnair MD;  Location: [facility]     LAPAROSCOPIC APPENDECTOMY N/A 6/18/2015    Procedure: LAPAROSCOPIC APPENDECTOMY;  Surgeon: Igor Mcnair MD;  Location:  [facility]     LAPAROSCOPIC ASSISTED COLECTOMY LEFT (DESCENDING) Left 6/18/2015    Procedure: LAPAROSCOPIC ASSISTED COLECTOMY LEFT (DESCENDING);  Surgeon: Igor Mcnair MD;  Location:  OR     LAPAROSCOPIC HERNIORRHAPHY INGUINAL BILATERAL Bilateral 4/28/2016    Procedure: LAPAROSCOPIC HERNIORRHAPHY INGUINAL BILATERAL;  Surgeon: Igor Mcnair MD;  Location: [facility]     LAPAROSCOPIC HERNIORRHAPHY VENTRAL N/A 6/18/2015    Procedure: LAPAROSCOPIC HERNIORRHAPHY VENTRAL;  Surgeon: Igor Mcnair MD;  Location:  [facility]     LAPAROSCOPIC HERNIORRHAPHY VENTRAL N/A 4/28/2016    Procedure: LAPAROSCOPIC HERNIORRHAPHY VENTRAL;  Surgeon: Igor Mcnair MD;  Location: [facility]     LAPAROSCOPIC LYSIS  "ADHESIONS N/A 6/18/2015    Procedure: LAPAROSCOPIC LYSIS ADHESIONS;  Surgeon: Igor Mcnair MD;  Location:  [facility]     PHACOEMULSIFICATION CLEAR CORNEA WITH STANDARD INTRAOCULAR LENS IMPLANT Right 8/22/2016    Procedure: PHACOEMULSIFICATION CLEAR CORNEA WITH STANDARD INTRAOCULAR LENS IMPLANT;  Surgeon: Jaylan Mills MD;  [facility]     VARICOCELECTOMY  1950's     ZZC BOWEL TO BOWEL ANASTOMOSIS         EXAM:  BP: 114/68   Pulse: 51    Temp: 97.6    Wt Readings from Last 2 Encounters:   01/06/23 100.7 kg (222 lb)   07/21/22 101.6 kg (224 lb)       BMI= Body mass index is 27.02 kg/m .    EXAM:  APPEARANCE: = Relaxed and in no distress  Ears/Nose = External structures and Nares have usual shape and form  Ear canals and TM's = Canals are not inflammed and have none or little wax and the drums are not injected and have a light reflex   Oropharynx =  mild erythema and too infection.  Resp effort = Calm regular breathing  Breath Sounds = Good air movement with no rales or rhonchi in any lung fields      Assessment/Plan:  Felix was seen today for cough and dental problem.    Diagnoses and all orders for this visit:    Fever, unspecified fever cause  -     CBC with Diff/Plt ([facility])  -     Influenza + SARS Antigen ([facility])  -     VENOUS COLLECTION    Body aches  -     CBC with Diff/Plt ([facility])  -     Influenza + SARS Antigen ([facility])  -     VENOUS COLLECTION    Recurrent major depressive disorder, in full remission (H)  -     VENOUS COLLECTION    Alcohol dependence in remission (H)  -     VENOUS COLLECTION    Tooth impaction  -     clindamycin (CLEOCIN) 300 MG capsule; Take 1 capsule (300 mg) by mouth 3 times daily        COUNSELING:   reports that he quit smoking about 21 years ago. His smoking use included cigarettes. He has a 30.00 pack-year smoking history. He has never used smokeless tobacco.    Estimated body mass index is 27.02 kg/m  as calculated from the following:    Height as of 7/21/22: 1.93 m (6' 4\").    " Weight as of this encounter: 100.7 kg (222 lb).       Appropriate preventive services were discussed with this patient, including applicable screening as appropriate for cardiovascular disease, diabetes, osteopenia/osteoporosis, and glaucoma.  As appropriate for age/gender, discussed screening for colorectal cancer, prostate cancer, breast cancer, and cervical cancer. Checklist reviewing preventive services available has been given to the patient.    Reviewed patients plan of care and provided an AVS. The Basic Care Plan (routine screening as documented in Health Maintenance) for Felix meets the Care Plan requirement. This Care Plan has been established and reviewed with the  Patient.      The following health maintenance items are reviewed in Epic and correct as of today:  Health Maintenance   Topic Date Due     ZOSTER IMMUNIZATION (2 of 3) 12/18/2009     MEDICARE ANNUAL WELLNESS VISIT  12/10/2022     FALL RISK ASSESSMENT  06/10/2023     PHQ-9  07/06/2023     COLORECTAL CANCER SCREENING  05/09/2024     DTAP/TDAP/TD IMMUNIZATION (3 - Td or Tdap) 12/21/2025     LIPID  12/10/2026     ADVANCE CARE PLANNING  12/10/2026     HEPATITIS C SCREENING  Completed     DEPRESSION ACTION PLAN  Completed     INFLUENZA VACCINE  Completed     Pneumococcal Vaccine: 65+ Years  Completed     AORTIC ANEURYSM SCREENING (SYSTEM ASSIGNED)  Completed     COVID-19 Vaccine  Completed     IPV IMMUNIZATION  Aged Out     MENINGITIS IMMUNIZATION  Aged Out       Marvel De La Fuente  [facility]  For any issues my office # is [phone]

## 2023-03-03 ENCOUNTER — OFFICE VISIT (OUTPATIENT)
Dept: FAMILY MEDICINE | Facility: CLINIC | Age: 73
End: 2023-03-03

## 2023-03-03 VITALS
WEIGHT: 217 LBS | OXYGEN SATURATION: 96 % | BODY MASS INDEX: 26.41 KG/M2 | SYSTOLIC BLOOD PRESSURE: 124 MMHG | DIASTOLIC BLOOD PRESSURE: 72 MMHG | HEART RATE: 52 BPM

## 2023-03-03 DIAGNOSIS — N39.0 URINARY TRACT INFECTION IN MALE: ICD-10-CM

## 2023-03-03 DIAGNOSIS — R39.9 LOWER URINARY TRACT SYMPTOMS (LUTS): Primary | ICD-10-CM

## 2023-03-03 LAB
BACTERIA (RMG): ABNORMAL
BILIRUBIN (RMG): NEGATIVE
BLOOD (RMG): ABNORMAL
CASTS (RMG): ABNORMAL
COLOR UR: YELLOW
CRYSTAL (RMG): ABNORMAL
EPITHELIAL (RMG): ABNORMAL
GLUCOSE (RMG): NEGATIVE
KETONE (RMG): NEGATIVE
LEUKOCYTE (RMG): ABNORMAL
MUCOUS (RMG): ABNORMAL
NITRITE (RMG): NEGATIVE
PH UR STRIP: 5.5 PH (ref 5–9)
PROTEIN (RMG): ABNORMAL
RBC (RMG): ABNORMAL
SP GR UR STRIP: 1.03 (ref 1–1.02)
UROBILINOGEN (RMG): 0.2
WBC (RMG): ABNORMAL

## 2023-03-03 PROCEDURE — 99213 OFFICE O/P EST LOW 20 MIN: CPT | Performed by: NURSE PRACTITIONER

## 2023-03-03 PROCEDURE — 81003 URINALYSIS AUTO W/O SCOPE: CPT | Performed by: NURSE PRACTITIONER

## 2023-03-03 RX ORDER — METHYLPHENIDATE HYDROCHLORIDE 10 MG/1
TABLET ORAL
COMMUNITY
Start: 2023-02-14 | End: 2023-03-03

## 2023-03-03 RX ORDER — SULFAMETHOXAZOLE/TRIMETHOPRIM 800-160 MG
1 TABLET ORAL 2 TIMES DAILY
Qty: 14 TABLET | Refills: 0 | Status: SHIPPED | OUTPATIENT
Start: 2023-03-03 | End: 2023-03-10

## 2023-03-03 NOTE — PROGRESS NOTES
Problem(s) Oriented visit        SUBJECTIVE:                                                    Felix Millard is a 73 year old male who presents to clinic today for the following health issues :    Pain with urination for the past week. Increased frequency, urgency, less output when he urinates. No history of this except for when he had gonorrhea in college.    Problem list, Medication list, Allergies, and Medical/Social/Surgical histories reviewed in [facility] and updated as appropriate.   Additional history: as documented    ROS:  5 point ROS completed and negative except noted above, including Gen, GI,     OBJECTIVE:                                                    /72   Pulse 52   Wt 98.4 kg (217 lb)   SpO2 96%   BMI 26.41 kg/m    Body mass index is 26.41 kg/m .   GENERAL: Older adult male in no distress  PSYCH: mentation appears normal, affect normal/bright    UA RESULTS:  Recent Labs   Lab Test 03/03/23  1602 06/28/17  1104 04/30/16  1205   COLOR Yellow* Yellow Yellow   APPEARANCE  --   --  Clear   URINEGLC  --   --  Negative   URINEBILI  --   --  Negative   URINEKETONE  --   --  Negative   SG 1.030 1.015 1.015   UBLD  --   --  Negative   URINEPH 5.5 6.0 5.5   PROTEIN  3+  --  Negative   UROBILINOGEN  --  0.2  --    NITRITE  neg Neg Negative   LEUKEST  1+   --  Negative   RBCU  loosely packed  --  <1   WBCU  loosely packed  --  1   Bacteria - moderate       ASSESSMENT/PLAN:                                                      Felix was seen today for urinary problem.    Diagnoses and all orders for this visit:    Lower urinary tract symptoms (LUTS)  -     Urinalysis ([facility])    Urinary tract infection in male  -     Urine Culture  Routine ([facility])  -     sulfamethoxazole-trimethoprim (BACTRIM DS) 800-160 MG tablet; Take 1 tablet by mouth 2 times daily for 7 days    UTI based on the clinical information.    An antibiotic is indicated.  Discussed urinary tract infections, including risk factors  (including sexual activity, bladder outlet obstruction issues, etc), prevention strategies, and basics of UTI management.   Drink fluids to produce regular urination which can help prevent future UTIs.  If the symptoms are not cleared with the prescribed treatment, contact us for further evaluation.    See Patient Instructions  Patient Instructions   Take Trimethoprim-Sulfa (Bactrim or Septra) 1 tab twice daily for 7 days.      Drink plenty of water  Empty bladder frequently    Call Monday if not improving, sooner if any new/worsening symptoms        JAY Edgar CNP  [facility]  Family Practice  [facility]  [phone]    For any issues my office # is [phone]

## 2023-03-03 NOTE — PATIENT INSTRUCTIONS
Take Trimethoprim-Sulfa (Bactrim or Septra) 1 tab twice daily for 7 days.      Drink plenty of water  Empty bladder frequently    Call Monday if not improving, sooner if any new/worsening symptoms

## 2023-03-05 LAB
Lab: NO GROWTH
URINE CULTURE: NORMAL

## 2023-03-07 ENCOUNTER — TELEPHONE (OUTPATIENT)
Dept: FAMILY MEDICINE | Facility: CLINIC | Age: 73
End: 2023-03-07

## 2023-03-13 NOTE — TELEPHONE ENCOUNTER
----- Message from JAY Frederick CNP sent at 3/6/2023 10:02 AM CST -----  Please call patient with the following results/message:    Urine culture did not grow bacteria. If improving symptoms with antibiotic then take and finish as prescribed. If not improving, then follow-up.    JAY Edgar CNP on 3/6/2023 at 10:02 AM

## 2023-03-13 NOTE — TELEPHONE ENCOUNTER
Called and spoke with patient on 3/7 regarding urine culture. Patient reported the symptoms were improving, he reports that he will finish abx. He is advised to call clinic with questions or concerns. Saida Bateman

## 2023-04-13 ENCOUNTER — OFFICE VISIT (OUTPATIENT)
Dept: FAMILY MEDICINE | Facility: CLINIC | Age: 73
End: 2023-04-13

## 2023-04-13 VITALS
WEIGHT: 216.8 LBS | TEMPERATURE: 97.5 F | BODY MASS INDEX: 26.96 KG/M2 | OXYGEN SATURATION: 95 % | HEART RATE: 53 BPM | SYSTOLIC BLOOD PRESSURE: 138 MMHG | DIASTOLIC BLOOD PRESSURE: 84 MMHG | HEIGHT: 75 IN

## 2023-04-13 DIAGNOSIS — R39.9 LOWER URINARY TRACT SYMPTOMS (LUTS): Primary | ICD-10-CM

## 2023-04-13 DIAGNOSIS — R30.0 DIFFICULT OR PAINFUL URINATION: ICD-10-CM

## 2023-04-13 LAB
BACTERIA (RMG): ABNORMAL
BILIRUBIN (RMG): NEGATIVE
BLOOD (RMG): ABNORMAL
CASTS (RMG): ABNORMAL
COLOR UR: ABNORMAL
CRYSTAL (RMG): ABNORMAL
EPITHELIAL (RMG): ABNORMAL
GLUCOSE (RMG): NEGATIVE
KETONE (RMG): NEGATIVE
LEUKOCYTE (RMG): ABNORMAL
MUCOUS (RMG): ABNORMAL
NITRITE (RMG): NEGATIVE
PH UR STRIP: 5.5 PH (ref 5–9)
PROTEIN (RMG): ABNORMAL
RBC (RMG): ABNORMAL
SP GR UR STRIP: 1.03 (ref 1–1.02)
UROBILINOGEN (RMG): 0.2
WBC (RMG): ABNORMAL

## 2023-04-13 PROCEDURE — 99213 OFFICE O/P EST LOW 20 MIN: CPT | Performed by: NURSE PRACTITIONER

## 2023-04-13 PROCEDURE — 81003 URINALYSIS AUTO W/O SCOPE: CPT | Performed by: NURSE PRACTITIONER

## 2023-04-13 RX ORDER — SULFAMETHOXAZOLE/TRIMETHOPRIM 800-160 MG
1 TABLET ORAL 2 TIMES DAILY
Qty: 28 TABLET | Refills: 0 | Status: SHIPPED | OUTPATIENT
Start: 2023-04-13 | End: 2023-04-27

## 2023-04-13 NOTE — PROGRESS NOTES
"Problem(s) Oriented visit        SUBJECTIVE:                                                    Felix Millard is a 73 year old male who presents to clinic today for the following health issues :    Has urinary symptoms including difficulty emptying bladder, pain with urination especially when initiating, urinary frequency. Treated for possible UTI about one month ago but culture ended up not growing any bacteria. Denies fever, chills, hematuria, flank pain, abdominal pain. No prior issues with prostate. No history of kidney stones    Problem list, Medication list, Allergies, and Medical/Social/Surgical histories reviewed in [facility] and updated as appropriate.   Additional history: as documented    ROS:  5 point ROS completed and negative except noted above, including Gen, CV, Resp, GI,     OBJECTIVE:                                                    /84   Pulse 53   Temp 97.5  F (36.4  C)   Ht 1.899 m (6' 2.75\")   Wt 98.3 kg (216 lb 12.8 oz)   SpO2 95%   BMI 27.28 kg/m    Body mass index is 27.28 kg/m .   GENERAL: Adult male in no distress  PSYCH: mentation appears normal, affect normal/bright   UA RESULTS:  Recent Labs   Lab Test 04/13/23  1526 03/03/23  1602 06/28/17  1104 04/30/16  1205   COLOR Straw*   < > Yellow Yellow   APPEARANCE  --   --   --  Clear   URINEGLC  --   --   --  Negative   URINEBILI  --   --   --  Negative   URINEKETONE  --   --   --  Negative   SG 1.030   < > 1.015 1.015   UBLD  --   --   --  Negative   URINEPH 5.5   < > 6.0 5.5   PROTEIN  --   --   --  Negative   UROBILINOGEN  --   --  0.2  --    NITRITE  --   --  Neg Negative   LEUKEST  --   --   --  Negative   RBCU Loosely packed*   < >  --  <1   WBCU Loosely packed*   < >  --  1    < > = values in this interval not displayed.       ASSESSMENT/PLAN:                                                      Felix was seen today for urinary problem.    Diagnoses and all orders for this visit:    Lower urinary tract symptoms " (LUTS)  -     Urinalysis ([facility])  -     Urine Culture  Routine ([facility])    Difficult or painful urination  -     Adult Urology  Referral; Future  -     sulfamethoxazole-trimethoprim (BACTRIM DS) 800-160 MG tablet; Take 1 tablet by mouth 2 times daily for 14 days    Symptoms and UA appear consistent with infection. Treating with Bactrim DS BID x 14 days. Urology referral placed.     JAY Edgar CNP  [facility]  [facility]  [phone]    For any issues my office # is [phone]

## 2023-04-16 LAB
Lab: NO GROWTH
URINE CULTURE: NORMAL

## 2023-06-05 ENCOUNTER — OFFICE VISIT (OUTPATIENT)
Dept: FAMILY MEDICINE | Facility: CLINIC | Age: 73
End: 2023-06-05

## 2023-06-05 VITALS
BODY MASS INDEX: 27.38 KG/M2 | SYSTOLIC BLOOD PRESSURE: 150 MMHG | HEART RATE: 52 BPM | OXYGEN SATURATION: 97 % | WEIGHT: 217.6 LBS | DIASTOLIC BLOOD PRESSURE: 86 MMHG

## 2023-06-05 DIAGNOSIS — H60.541 DERMATITIS OF EAR CANAL, RIGHT: Primary | ICD-10-CM

## 2023-06-05 PROCEDURE — 99213 OFFICE O/P EST LOW 20 MIN: CPT | Performed by: FAMILY MEDICINE

## 2023-06-05 RX ORDER — FLUOCINOLONE ACETONIDE 0.11 MG/ML
5 OIL AURICULAR (OTIC)
Qty: 5 ML | Refills: 0 | Status: SHIPPED | OUTPATIENT
Start: 2023-06-05 | End: 2024-03-13

## 2023-06-05 NOTE — PATIENT INSTRUCTIONS
[facility] will call you to coordinate care as prescribed your provider. If you don t hear from a representative within 2 business days, please call [phone].

## 2023-06-05 NOTE — PROGRESS NOTES
Rahat Washington is a 73 year old patient who presents to clinic for evaluation.  Itchy and irritated right ear.  No pain.  No swelling.  He swims a lot.        Review of Systems   Constitutional, HEENT, cardiovascular, pulmonary, gi and gu systems are negative, except as otherwise noted.      Objective    BP (!) 150/86   Pulse 52   Wt 98.7 kg (217 lb 9.6 oz)   SpO2 97%   BMI 27.38 kg/m      General: Well appearing, NAD  HEENT: no obvious canal swelling.  Small area of flaking skin and bleeding external canal.  No otorrhea appreciated.  TM normal.  Psych: normal mood and affect      Dermatitis of ear canal, right  Reassurance.  Trial of steroid drops.  If not improved follow up  - fluocinolone acetonide oil 0.01 % ear drops; Place 0.25 mLs (5 drops) into the right ear 2 times daily for 10 days

## 2023-06-19 ENCOUNTER — OFFICE VISIT (OUTPATIENT)
Dept: FAMILY MEDICINE | Facility: CLINIC | Age: 73
End: 2023-06-19

## 2023-06-19 VITALS
SYSTOLIC BLOOD PRESSURE: 135 MMHG | BODY MASS INDEX: 26.73 KG/M2 | DIASTOLIC BLOOD PRESSURE: 66 MMHG | WEIGHT: 212.4 LBS | OXYGEN SATURATION: 99 % | HEART RATE: 47 BPM | TEMPERATURE: 98.7 F

## 2023-06-19 DIAGNOSIS — R39.9 LOWER URINARY TRACT SYMPTOMS (LUTS): Primary | ICD-10-CM

## 2023-06-19 LAB
BACTERIA (RMG): ABNORMAL
BILIRUBIN (RMG): NEGATIVE
BLOOD (RMG): ABNORMAL
CASTS (RMG): ABNORMAL
COLOR UR: YELLOW
CRYSTAL (RMG): ABNORMAL
EPITHELIAL (RMG): ABNORMAL
GLUCOSE (RMG): NEGATIVE
KETONE (RMG): NEGATIVE
LEUKOCYTE (RMG): ABNORMAL
MUCOUS (RMG): ABNORMAL
NITRITE (RMG): NEGATIVE
PH UR STRIP: 5.5 PH (ref 5–9)
PROTEIN (RMG): ABNORMAL
RBC (RMG): ABNORMAL
RENAL EPITHELIAL: ABNORMAL
SP GR UR STRIP: 1.03 (ref 1–1.02)
UROBILINOGEN (RMG): 0.2
WBC (RMG): ABNORMAL
WBC CLUMPS URNS QL MICRO: PRESENT

## 2023-06-19 PROCEDURE — 81003 URINALYSIS AUTO W/O SCOPE: CPT | Mod: QW | Performed by: FAMILY MEDICINE

## 2023-06-19 PROCEDURE — 99214 OFFICE O/P EST MOD 30 MIN: CPT | Performed by: FAMILY MEDICINE

## 2023-06-19 RX ORDER — NITROFURANTOIN 25; 75 MG/1; MG/1
100 CAPSULE ORAL 2 TIMES DAILY
Qty: 20 CAPSULE | Refills: 1 | Status: SHIPPED | OUTPATIENT
Start: 2023-06-19 | End: 2024-03-13

## 2023-06-19 NOTE — PROGRESS NOTES
Problem(s) Oriented visit        SUBJECTIVE:                                                    Felix Millard is a 73 year old male who presents to clinic today for the following health issues :          1. Lower urinary tract symptoms (LUTS)  3rd recurrent   Due to see urology in august  - Urinalysis ([facility])  - Urine Culture  Routine ([facility])  - nitroFURantoin macrocrystal-monohydrate (MACROBID) 100 MG capsule; Take 1 capsule (100 mg) by mouth 2 times daily  Dispense: 20 capsule; Refill: 1  - Radiology Referral; Future       Problem list, Medication list, Allergies, and Medical/Social/Surgical histories reviewed in [facility] and updated as appropriate.   Additional history: as documented    ROS:  General and Resp. completed and negative except as noted above    Histories:   Patient Active Problem List   Diagnosis     ADD (attention deficit disorder)     Seasonal allergies     Diverticulosis     Eczema     Hx of diverticulitis of colon     Sobriety Since October 1980!     Recurrent major depressive disorder, in full remission (H)     History of colonic polyps     Past Surgical History:   Procedure Laterality Date     CHOLECYSTECTOMY, LAPOROSCOPIC  1980's    Cholecystectomy, Laparoscopic     HERNIORRHAPHY VENTRAL N/A 4/28/2016    Procedure: HERNIORRHAPHY VENTRAL;  Surgeon: Igor Mcnair MD;  Location: [facility]     LAPAROSCOPIC APPENDECTOMY N/A 6/18/2015    Procedure: LAPAROSCOPIC APPENDECTOMY;  Surgeon: Igor Mcnair MD;  Location:  OR     LAPAROSCOPIC ASSISTED COLECTOMY LEFT (DESCENDING) Left 6/18/2015    Procedure: LAPAROSCOPIC ASSISTED COLECTOMY LEFT (DESCENDING);  Surgeon: Igor Mcnair MD;  Location:  OR     LAPAROSCOPIC HERNIORRHAPHY INGUINAL BILATERAL Bilateral 4/28/2016    Procedure: LAPAROSCOPIC HERNIORRHAPHY INGUINAL BILATERAL;  Surgeon: Igor Mcnair MD;  Location: [facility]     LAPAROSCOPIC HERNIORRHAPHY VENTRAL N/A 6/18/2015    Procedure: LAPAROSCOPIC HERNIORRHAPHY VENTRAL;   Surgeon: gIor Mcnair MD;  Location:  OR     LAPAROSCOPIC HERNIORRHAPHY VENTRAL N/A 2016    Procedure: LAPAROSCOPIC HERNIORRHAPHY VENTRAL;  Surgeon: Igor Mcnair MD;  [facility]     LAPAROSCOPIC LYSIS ADHESIONS N/A 2015    Procedure: LAPAROSCOPIC LYSIS ADHESIONS;  Surgeon: Igor Mcnair MD;  Location:  OR     PHACOEMULSIFICATION CLEAR CORNEA WITH STANDARD INTRAOCULAR LENS IMPLANT Right 2016    Procedure: PHACOEMULSIFICATION CLEAR CORNEA WITH STANDARD INTRAOCULAR LENS IMPLANT;  Surgeon: Jaylan Mills MD;  Location:  [facility]     VARICOCELECTOMY  's     ZZC BOWEL TO BOWEL ANASTOMOSIS         Social History     Tobacco Use     Smoking status: Former     Packs/day: 1.00     Years: 30.00     Pack years: 30.00     Types: Cigarettes     Quit date: 2001     Years since quittin.6     Smokeless tobacco: Never   Vaping Use     Vaping status: Not on file   Substance Use Topics     Alcohol use: No     Alcohol/week: 0.0 standard drinks of alcohol     Comment: quit oct 1980     Family History   Problem Relation Age of Onset     Atrial fibrillation Mother      Cerebrovascular Disease Father      Diabetes Maternal Grandmother            OBJECTIVE:                                                    /66   Pulse (!) 47   Temp 98.7  F (37.1  C) (Oral)   Wt 96.3 kg (212 lb 6.4 oz)   SpO2 99%   BMI 26.73 kg/m    Body mass index is 26.73 kg/m .   APPEARANCE: = Relaxed and in no distress  Ext (edema) = No pretibial edema noted or elsewhere  Musculsktl =  Strength and ROM of major joints are within normal limits  NEURO = CN II-XII are tested and no deficits found  Mood/Affect = Cooperative and interested     ASSESSMENT/PLAN:                                                        Felix was seen today for urinary problem.    Diagnoses and all orders for this visit:    Lower urinary tract symptoms (LUTS)  -     Urinalysis ([facility])  -     Urine Culture  Routine  ([facility])  -     nitroFURantoin macrocrystal-monohydrate (MACROBID) 100 MG capsule; Take 1 capsule (100 mg) by mouth 2 times daily  -     Radiology Referral; Future        Work on weight loss  There are no Patient Instructions on file for this visit.    The following health maintenance items are reviewed in Epic and correct as of today:  Health Maintenance   Topic Date Due     ZOSTER IMMUNIZATION (2 of 3) 12/18/2009     MEDICARE ANNUAL WELLNESS VISIT  12/10/2022     COVID-19 Vaccine (6 - Pfizer series) 02/27/2023     FALL RISK ASSESSMENT  06/10/2023     PHQ-9  07/06/2023     COLORECTAL CANCER SCREENING  05/09/2024     DTAP/TDAP/TD IMMUNIZATION (2 - Td or Tdap) 12/21/2025     LIPID  12/10/2026     ADVANCE CARE PLANNING  12/10/2026     HEPATITIS C SCREENING  Completed     DEPRESSION ACTION PLAN  Completed     INFLUENZA VACCINE  Completed     Pneumococcal Vaccine: 65+ Years  Completed     AORTIC ANEURYSM SCREENING (SYSTEM ASSIGNED)  Completed     IPV IMMUNIZATION  Aged Out     MENINGITIS IMMUNIZATION  Aged Out       Marvel De La Fuente MD  [facility]  [facility]  [phone]    For any issues my office # is [phone]

## 2023-06-21 LAB
Lab: NO GROWTH
URINE CULTURE: NORMAL

## 2023-06-26 ENCOUNTER — TRANSFERRED RECORDS (OUTPATIENT)
Dept: FAMILY MEDICINE | Facility: CLINIC | Age: 73
End: 2023-06-26

## 2023-06-26 NOTE — LETTER
June 28, 2023      Chemo Millard  [address]   [place] [address]      Dear Felix,     I am writing to report that your included test results are as expected.  The radiologist saw no stones.       Marvel De La Fuente MD

## 2023-06-28 NOTE — RESULT ENCOUNTER NOTE
Dear Felix,     I am writing to report that your included test results are as expected.  The radiologist saw no stones.      Marvel De La Fuente MD

## 2023-08-15 ENCOUNTER — OFFICE VISIT (OUTPATIENT)
Dept: UROLOGY | Facility: CLINIC | Age: 73
End: 2023-08-15
Attending: NURSE PRACTITIONER
Payer: MEDICARE

## 2023-08-15 VITALS — OXYGEN SATURATION: 96 % | DIASTOLIC BLOOD PRESSURE: 76 MMHG | SYSTOLIC BLOOD PRESSURE: 148 MMHG | HEART RATE: 66 BPM

## 2023-08-15 DIAGNOSIS — R30.0 DIFFICULT OR PAINFUL URINATION: ICD-10-CM

## 2023-08-15 DIAGNOSIS — R31.29 MICROSCOPIC HEMATURIA: Primary | ICD-10-CM

## 2023-08-15 LAB
ALBUMIN UR-MCNC: NEGATIVE MG/DL
APPEARANCE UR: CLEAR
BILIRUB UR QL STRIP: NEGATIVE
COLOR UR AUTO: YELLOW
GLUCOSE UR STRIP-MCNC: NEGATIVE MG/DL
HGB UR QL STRIP: NEGATIVE
KETONES UR STRIP-MCNC: NEGATIVE MG/DL
LEUKOCYTE ESTERASE UR QL STRIP: NEGATIVE
NITRATE UR QL: NEGATIVE
PH UR STRIP: 5.5 [PH] (ref 5–7)
SP GR UR STRIP: >=1.03 (ref 1–1.03)
UROBILINOGEN UR STRIP-ACNC: 0.2 E.U./DL

## 2023-08-15 PROCEDURE — 99204 OFFICE O/P NEW MOD 45 MIN: CPT | Performed by: PHYSICIAN ASSISTANT

## 2023-08-15 PROCEDURE — 81003 URINALYSIS AUTO W/O SCOPE: CPT | Mod: QW | Performed by: PHYSICIAN ASSISTANT

## 2023-08-15 NOTE — PROGRESS NOTES
CC: LUTS    HPI:  Felix Millard is a pleasant 73 year old male who presents in consultation from Dr. De La Fuente for evaluation of the above. Has have a few episodes of UTI-like symptoms and micro hematuria. No gross hematuria. Urine cultures are negative.     Notes more urgency, frequency, urge incontinence and nocturia the past several months. Renal US noting diffusely irregular bladder wall with diverticula. No hydro.    Past Medical History:   Diagnosis Date    ADD (attention deficit disorder)     COVID-19 12/2020    Occasional Dyspea    Depressive disorder, not elsewhere classified     Diverticulosis     Eczema     History of shingles     Hyperlipidaemia LDL goal < 100     Major depression in complete remission (H) 08/15/2016    Major depression in complete remission (H) 08/15/2016    Mumps     Seasonal allergies     Tinnitus        Past Surgical History:   Procedure Laterality Date    CHOLECYSTECTOMY, LAPOROSCOPIC  1980's    Cholecystectomy, Laparoscopic    HERNIORRHAPHY VENTRAL N/A 04/28/2016    Procedure: HERNIORRHAPHY VENTRAL;  Surgeon: Igor Mcnair MD;  Location: [facility]    LAPAROSCOPIC APPENDECTOMY N/A 06/18/2015    Procedure: LAPAROSCOPIC APPENDECTOMY;  Surgeon: Igor Mcnair MD;  Location:  OR    LAPAROSCOPIC ASSISTED COLECTOMY LEFT (DESCENDING) Left 06/18/2015    Procedure: LAPAROSCOPIC ASSISTED COLECTOMY LEFT (DESCENDING);  Surgeon: Igor Mcnair MD;  Location:  OR    LAPAROSCOPIC HERNIORRHAPHY INGUINAL BILATERAL Bilateral 04/28/2016    Procedure: LAPAROSCOPIC HERNIORRHAPHY INGUINAL BILATERAL;  Surgeon: Igor Mcnair MD;  Location: [facility]    LAPAROSCOPIC HERNIORRHAPHY VENTRAL N/A 06/18/2015    Procedure: LAPAROSCOPIC HERNIORRHAPHY VENTRAL;  Surgeon: Igor Mcnair MD;  Location:  OR    LAPAROSCOPIC HERNIORRHAPHY VENTRAL N/A 04/28/2016    Procedure: LAPAROSCOPIC HERNIORRHAPHY VENTRAL;  Surgeon: Igor Mcnair MD;  Location: [facility]    LAPAROSCOPIC  LYSIS ADHESIONS N/A 2015    Procedure: LAPAROSCOPIC LYSIS ADHESIONS;  Surgeon: Igor Mcnair MD;  Location:  OR    PHACOEMULSIFICATION CLEAR CORNEA WITH STANDARD INTRAOCULAR LENS IMPLANT Right 2016    Procedure: PHACOEMULSIFICATION CLEAR CORNEA WITH STANDARD INTRAOCULAR LENS IMPLANT;  Surgeon: Jaylan Mills MD;  [facility]    TESTICLE SURGERY      VARICOCELECTOMY  1950's    ZZC BOWEL TO BOWEL ANASTOMOSIS         Social History     Socioeconomic History    Marital status:      Spouse name: Not on file    Number of children: Not on file    Years of education: Not on file    Highest education level: Not on file   Occupational History    Not on file   Tobacco Use    Smoking status: Former     Packs/day: 1.00     Years: 30.00     Pack years: 30.00     Types: Cigarettes     Quit date: 2001     Years since quittin.7    Smokeless tobacco: Never   Substance and Sexual Activity    Alcohol use: No     Alcohol/week: 0.0 standard drinks of alcohol     Comment: quit oct 1980    Drug use: Never    Sexual activity: Yes   Other Topics Concern    Parent/sibling w/ CABG, MI or angioplasty before 65F 55M? Not Asked   Social History Narrative    Not on file     Social Determinants of Health     Financial Resource Strain: Not on file   Food Insecurity: Not on file   Transportation Needs: Not on file   Physical Activity: Not on file   Stress: Not on file   Social Connections: Not on file   Intimate Partner Violence: Not on file   Housing Stability: Not on file       Family History   Problem Relation Age of Onset    Atrial fibrillation Mother     Cerebrovascular Disease Father     Diabetes Maternal Grandmother        Allergies   Allergen Reactions    Gluten Meal Fatigue    Amoxicillin-Pot Clavulanate Hives    Ciprofloxacin Hives    Wheat Bran Nausea    Oxycodone Anxiety       Current Outpatient Medications   Medication    Aspirin-Acetaminophen-Caffeine (EXCEDRIN EXTRA STRENGTH PO)     buPROPion (WELLBUTRIN SR) 150 MG 12 hr tablet    escitalopram (LEXAPRO) 20 MG tablet    Methylphenidate HCl (RITALIN PO)    Multiple Vitamins-Minerals (CENTRUM PO)    VITAMIN D, CHOLECALCIFEROL, PO    clindamycin (CLEOCIN) 300 MG capsule    nitroFURantoin macrocrystal-monohydrate (MACROBID) 100 MG capsule     No current facility-administered medications for this visit.         PEx:   Blood pressure (!) 148/76, pulse 66, SpO2 96 %.    PSYCH: NAD  EYES: EOMI  MOUTH: MMM  NEURO: AAO x3    Urine:      A/P: Felix Millrad is a 73 year old male with high risk microhematuria and LUTS and bladder wall thickening.   -CT urogram (hydrate well day of exam).  -Urine cytology (Not enough urine today).  -Cysto  -We discussed trial of Flomax and the SE related to this. He would like to defer until after hematuria eval.       Bhakti Jackson PA-C  [facility]        30 minutes spent on the date of the encounter doing chart review, review of outside records, review of test results, interpretation of tests, patient visit and documentation

## 2023-08-15 NOTE — LETTER
8/15/2023       RE: Felix Millard  [address]  [place] [address]     Dear Colleague,    Thank you for referring your patient, Felix Millard, to the [facility] at [facility]. Please see a copy of my visit note below.    CC: LUTS    HPI:  Felix Millard is a pleasant 73 year old male who presents in consultation from Dr. De La Fuente for evaluation of the above. Has have a few episodes of UTI-like symptoms and micro hematuria. No gross hematuria. Urine cultures are negative.     Notes more urgency, frequency, urge incontinence and nocturia the past several months. Renal US noting diffusely irregular bladder wall with diverticula. No hydro.    Past Medical History:   Diagnosis Date    ADD (attention deficit disorder)     COVID-19 12/2020    Occasional Dyspea    Depressive disorder, not elsewhere classified     Diverticulosis     Eczema     History of shingles     Hyperlipidaemia LDL goal < 100     Major depression in complete remission (H) 08/15/2016    Major depression in complete remission (H) 08/15/2016    Mumps     Seasonal allergies     Tinnitus        Past Surgical History:   Procedure Laterality Date    CHOLECYSTECTOMY, LAPOROSCOPIC  1980's    Cholecystectomy, Laparoscopic    HERNIORRHAPHY VENTRAL N/A 04/28/2016    Procedure: HERNIORRHAPHY VENTRAL;  Surgeon: Igor Mcnair MD;  Location: [facility]    LAPAROSCOPIC APPENDECTOMY N/A 06/18/2015    Procedure: LAPAROSCOPIC APPENDECTOMY;  Surgeon: Igor Mcnair MD;  Location:  OR    LAPAROSCOPIC ASSISTED COLECTOMY LEFT (DESCENDING) Left 06/18/2015    Procedure: LAPAROSCOPIC ASSISTED COLECTOMY LEFT (DESCENDING);  Surgeon: Igor Mcnair MD;  Location:  OR    LAPAROSCOPIC HERNIORRHAPHY INGUINAL BILATERAL Bilateral 04/28/2016    Procedure: LAPAROSCOPIC HERNIORRHAPHY INGUINAL BILATERAL;  Surgeon: Igor Mcnair MD;  Location: [facility]     LAPAROSCOPIC HERNIORRHAPHY VENTRAL N/A 2015    Procedure: LAPAROSCOPIC HERNIORRHAPHY VENTRAL;  Surgeon: Igor Mcnair MD;  Location:  OR    LAPAROSCOPIC HERNIORRHAPHY VENTRAL N/A 2016    Procedure: LAPAROSCOPIC HERNIORRHAPHY VENTRAL;  Surgeon: Igor Mcnair MD;  [facility]    LAPAROSCOPIC LYSIS ADHESIONS N/A 2015    Procedure: LAPAROSCOPIC LYSIS ADHESIONS;  Surgeon: Igor Mcnair MD;  Location:  OR    PHACOEMULSIFICATION CLEAR CORNEA WITH STANDARD INTRAOCULAR LENS IMPLANT Right 2016    Procedure: PHACOEMULSIFICATION CLEAR CORNEA WITH STANDARD INTRAOCULAR LENS IMPLANT;  Surgeon: Jaylan Mills MD;  Location:  [facility]    TESTICLE SURGERY      VARICOCELECTOMY  's    ZZC BOWEL TO BOWEL ANASTOMOSIS         Social History     Socioeconomic History    Marital status:      Spouse name: Not on file    Number of children: Not on file    Years of education: Not on file    Highest education level: Not on file   Occupational History    Not on file   Tobacco Use    Smoking status: Former     Packs/day: 1.00     Years: 30.00     Pack years: 30.00     Types: Cigarettes     Quit date: 2001     Years since quittin.7    Smokeless tobacco: Never   Substance and Sexual Activity    Alcohol use: No     Alcohol/week: 0.0 standard drinks of alcohol     Comment: quit oct 1980    Drug use: Never    Sexual activity: Yes   Other Topics Concern    Parent/sibling w/ CABG, MI or angioplasty before 65F 55M? Not Asked   Social History Narrative    Not on file     Social Determinants of Health     Financial Resource Strain: Not on file   Food Insecurity: Not on file   Transportation Needs: Not on file   Physical Activity: Not on file   Stress: Not on file   Social Connections: Not on file   Intimate Partner Violence: Not on file   Housing Stability: Not on file       Family History   Problem Relation Age of Onset    Atrial fibrillation Mother     Cerebrovascular Disease  Father     Diabetes Maternal Grandmother        Allergies   Allergen Reactions    Gluten Meal Fatigue    Amoxicillin-Pot Clavulanate Hives    Ciprofloxacin Hives    Wheat Bran Nausea    Oxycodone Anxiety       Current Outpatient Medications   Medication    Aspirin-Acetaminophen-Caffeine (EXCEDRIN EXTRA STRENGTH PO)    buPROPion (WELLBUTRIN SR) 150 MG 12 hr tablet    escitalopram (LEXAPRO) 20 MG tablet    Methylphenidate HCl (RITALIN PO)    Multiple Vitamins-Minerals (CENTRUM PO)    VITAMIN D, CHOLECALCIFEROL, PO    clindamycin (CLEOCIN) 300 MG capsule    nitroFURantoin macrocrystal-monohydrate (MACROBID) 100 MG capsule     No current facility-administered medications for this visit.         PEx:   Blood pressure (!) 148/76, pulse 66, SpO2 96 %.    PSYCH: NAD  EYES: EOMI  MOUTH: MMM  NEURO: AAO x3    Urine:      A/P: Felix Millard is a 73 year old male with high risk microhematuria and LUTS and bladder wall thickening.   -CT urogram (hydrate well day of exam).  -Urine cytology (Not enough urine today).  -Cysto  -We discussed trial of Flomax and the SE related to this. He would like to defer until after hematuria eval.       Bhakti Jackson PA-C  [facility]        30 minutes spent on the date of the encounter doing chart review, review of outside records, review of test results, interpretation of tests, patient visit and documentation

## 2023-08-15 NOTE — PATIENT INSTRUCTIONS
[place] office: [phone]    - Urine cytology to look for abnormal cells.   - CT scan of the kidneys.   - Cystoscopy with the  urologist to evaluate the interior of the bladder. Follow up as recommended by the urologist.    CYSTOSCOPY    What is a Cystoscopy?  This is a procedure done to check for problems inside the bladder.  Problems may include polyps (growths), tumors, inflammation (swelling and redness) and other concerns.    The Urologist inserts a thin tube (called a cystoscope) into the bladder.  The tube is about the size of a pencil.  We will give you numbing medicine to reduce the pain or discomfort you may feel.    The Urologist will be able to see inside the bladder by filling the bladder with water.  The water makes it easier to see any problems that may be present. You will have a sense to need to urinate and this is normal.       How should I get ready for the exam?  Nothing to do to prepare. You may eat normally the day of the exam. There is no sedation, so you may drive yourself to and from if you can drive.       Please tell your doctor if:  You have a history of urinary tract infections.  You know that you have a tumor in your bladder.  You have bleeding problems.  You have any allergies.  You are or may be pregnant.      What happens after the exam?  You may go back to your normal diet and activity as you feel ready.    For the next two days after the exam, you may notice:  Some blood in your urine.  Some burning when you urinate (use the toilet).  An urge to urinate more often.  Bladder spasms.    These are normal after the procedure. They should go away on their own after a day or two.      You can help to relieve the above listed symptoms by:  Drinking 6 to 8 large glasses of water each day (includes drinks at meals).  This will help clear the urine.  Take warm baths to relieve pain and bladder spasms.  Do not add anything to the bath water.  You may take Tylenol (acetaminophen) per label  instructions for discomfort.

## 2023-08-22 ENCOUNTER — HOSPITAL ENCOUNTER (OUTPATIENT)
Dept: CT IMAGING | Facility: CLINIC | Age: 73
Discharge: HOME | End: 2023-08-22
Attending: PHYSICIAN ASSISTANT | Admitting: PHYSICIAN ASSISTANT
Payer: MEDICARE

## 2023-08-22 DIAGNOSIS — R31.29 MICROSCOPIC HEMATURIA: ICD-10-CM

## 2023-08-22 DIAGNOSIS — R30.0 DIFFICULT OR PAINFUL URINATION: ICD-10-CM

## 2023-08-22 LAB
CREAT BLD-MCNC: 1.3 MG/DL (ref 0.7–1.3)
GFR SERPL CREATININE-BSD FRML MDRD: 57 ML/MIN/1.73M2

## 2023-08-22 PROCEDURE — 250N000009 HC RX 250: Performed by: PHYSICIAN ASSISTANT

## 2023-08-22 PROCEDURE — 250N000011 HC RX IP 250 OP 636: Performed by: PHYSICIAN ASSISTANT

## 2023-08-22 PROCEDURE — G1010 CDSM STANSON: HCPCS

## 2023-08-22 PROCEDURE — 82565 ASSAY OF CREATININE: CPT

## 2023-08-22 RX ORDER — IOPAMIDOL 755 MG/ML
100 INJECTION, SOLUTION INTRAVASCULAR ONCE
Status: COMPLETED | OUTPATIENT
Start: 2023-08-22 | End: 2023-08-22

## 2023-08-22 RX ADMIN — IOPAMIDOL 100 ML: 755 INJECTION, SOLUTION INTRAVENOUS at 12:42

## 2023-08-22 RX ADMIN — SODIUM CHLORIDE 80 ML: 9 INJECTION, SOLUTION INTRAVENOUS at 12:43

## 2023-08-23 ENCOUNTER — TELEPHONE (OUTPATIENT)
Dept: UROLOGY | Facility: CLINIC | Age: 73
End: 2023-08-23
Payer: MEDICARE

## 2023-08-23 NOTE — TELEPHONE ENCOUNTER
----- Message from Bhakti Jackson PA-C sent at 8/23/2023  8:25 AM CDT -----  Regarding: cysto  He still needs a cysto appt for hematuria.

## 2023-09-20 ENCOUNTER — OFFICE VISIT (OUTPATIENT)
Dept: UROLOGY | Facility: CLINIC | Age: 73
End: 2023-09-20
Payer: MEDICARE

## 2023-09-20 ENCOUNTER — TELEPHONE (OUTPATIENT)
Facility: CLINIC | Age: 73
End: 2023-09-20

## 2023-09-20 VITALS
HEIGHT: 76 IN | SYSTOLIC BLOOD PRESSURE: 160 MMHG | BODY MASS INDEX: 26.18 KG/M2 | DIASTOLIC BLOOD PRESSURE: 68 MMHG | OXYGEN SATURATION: 98 % | WEIGHT: 215 LBS | HEART RATE: 62 BPM

## 2023-09-20 DIAGNOSIS — N35.914 STRICTURE OF ANTERIOR URETHRA IN MALE, UNSPECIFIED STRICTURE TYPE: ICD-10-CM

## 2023-09-20 DIAGNOSIS — R31.29 MICROSCOPIC HEMATURIA: Primary | ICD-10-CM

## 2023-09-20 LAB
ALBUMIN UR-MCNC: >=300 MG/DL
APPEARANCE UR: CLEAR
BILIRUB UR QL STRIP: NEGATIVE
COLOR UR AUTO: YELLOW
GLUCOSE UR STRIP-MCNC: NEGATIVE MG/DL
HGB UR QL STRIP: ABNORMAL
KETONES UR STRIP-MCNC: NEGATIVE MG/DL
LEUKOCYTE ESTERASE UR QL STRIP: ABNORMAL
NITRATE UR QL: NEGATIVE
PH UR STRIP: 6 [PH] (ref 5–7)
SP GR UR STRIP: >=1.03 (ref 1–1.03)
UROBILINOGEN UR STRIP-ACNC: 0.2 E.U./DL

## 2023-09-20 PROCEDURE — 52281 CYSTOSCOPY AND TREATMENT: CPT | Performed by: STUDENT IN AN ORGANIZED HEALTH CARE EDUCATION/TRAINING PROGRAM

## 2023-09-20 PROCEDURE — 81003 URINALYSIS AUTO W/O SCOPE: CPT | Mod: QW | Performed by: STUDENT IN AN ORGANIZED HEALTH CARE EDUCATION/TRAINING PROGRAM

## 2023-09-20 PROCEDURE — 99214 OFFICE O/P EST MOD 30 MIN: CPT | Mod: 25 | Performed by: STUDENT IN AN ORGANIZED HEALTH CARE EDUCATION/TRAINING PROGRAM

## 2023-09-20 RX ORDER — LIDOCAINE HYDROCHLORIDE 20 MG/ML
JELLY TOPICAL ONCE
Status: COMPLETED | OUTPATIENT
Start: 2023-09-20 | End: 2023-09-20

## 2023-09-20 RX ADMIN — LIDOCAINE HYDROCHLORIDE 5 ML: 20 JELLY TOPICAL at 09:42

## 2023-09-20 ASSESSMENT — PAIN SCALES - GENERAL: PAINLEVEL: SEVERE PAIN (7)

## 2023-09-20 NOTE — PROGRESS NOTES
"CHIEF COMPLAINT   Felix Millard who is a 73 year old male returns today for follow-up of LUTS, microhematuria.      HPI   Felix Millard is a 73 year old male returns today for follow-up of LUTS, microhematuria    PHYSICAL EXAM  Patient is a 73 year old  male   Vitals: Blood pressure (!) 160/68, pulse 62, height 1.93 m (6' 4\"), weight 97.5 kg (215 lb), SpO2 98 %.  Body mass index is 26.17 kg/m .  General Appearance Adult:   Alert, no acute distress, oriented  HENT: throat/mouth:normal, good dentition  Lungs: no respiratory distress, or pursed lip breathing  Heart: No obvious jugular venous distension present  Abdomen: nondistended  Musculoskeltal: extremities normal, no peripheral edema  Skin: no suspicious lesions or rashes  Neuro: Alert, oriented, speech and mentation normal  Psych: affect and mood normal  Gait: Normal  : circ phallus, orthotopic meatus    All pertinent imaging reviewed:    Ct urogram 8/22/2023  IMPRESSION:   1.  Multiple bladder wall trabeculations and small bladder  diverticuli. Findings suspicious for acute cystitis with mild  enhancement, wall thickening and fat stranding most prominently around  the diverticuli in the anterior bladder wall.  2.  No urinary system calculi, hydronephrosis, suspicious renal masses  or urothelial lesions in the upper tracts.  3.  Mild prostatomegaly.    PRE-PROCEDURE DIAGNOSIS: LUTS, bladder wall thickening    POST-PROCEDURE DIAGNOSIS: LUTS, bladder wall thickening    PROCEDURE: Cystoscopy, with urethral dilation and Puckett placement    DESCRIPTION OF PROCEDURE: After informed consent was obtained, the patient was brought to the procedure room where he was placed in the supine position with all pressure points well padded.  The penis was prepped and draped in sterile fashion.     I attempted to pass the scope into the urethra but immediately found a short tight fossa navicularis stricture. I then offered urethral dilation. The urethra was serially dilated " with allyn sounds from 16 Fr up to 22 Fr. The scope was then able to pass through the urethra. Remainder of anterior urethra was normal. Prostatic urethra relatively short about 2.5 cm with moderately obstructive bilobar hypertrophy. Bladder had cloudy urine limiting visibility. There was severe trabeculation and numerous cellules. Scope removed and ferguson was placed    The flexible cystoscope was removed and the findings were described to the patient.       ASSESSMENT and PLAN  73 year old male returns today for follow-up of LUTS, microhematuria, found to have urethral stricture which has now been dilated. Will leave Ferguson catheter in place x 2 days prior to removal    He has a script for nitrofurantoin 100 mg bid, he should start this empirically now that we have dilated his urethra    Will defer starting alpha blocker at this time because I think most of his issues were from the urethral stricture and not the prostate    I reviewed the CT findings with the patient and they are consistent with chronic bladder outlet obstruction    - start nitrofurantoin  - return Friday 9/22/2023 for ferguson removal  - return 3 months with UA, PVR, AUA symptom score        Guanaco Mercado MD   [facility] Urology  [facility] Phone: [phone]

## 2023-09-20 NOTE — NURSING NOTE
Chief Complaint   Patient presents with    Hematuria     In office cystoscopy    Prior to the start of the procedure and with procedural staff participation, I verbally confirmed the patient s identity using two indicators, relevant allergies, that the procedure was appropriate and matched the consent or emergent situation, and that the correct equipment/implants were available. Immediately prior to starting the procedure I conducted the Time Out with the procedural staff and re-confirmed the patient s name, procedure, and site/side. I have wiped the patient off with the povidone-Iodine solution, draped them,  used Lidocaine hydrochloride jelly, and instilled sterile water into the bladder. (The Joint Commission universal protocol was followed.)  Yes    Sedation (Moderate or Deep): None   5mL 2% lidocaine hydrochloride Urojet instilled into urethra.    NDC# [address]-6  Lot #: [id number]  Expiration Date:  2-25    Catheter insertion documentation on 9/20/2023:    Felix Millard presents to the clinic for catheter insertion.  Reason for insertion: obstruction  Order has been verified. yes  Catheter successfully inserted into the urethral meatus in the usual sterile fashion without immediate complication.  Type of catheter placed: 16 Icelandic Coude catheter  Urine is clear in color.  300 cc's of urine output returned.  Balloon was filled with 10 cc's of normal saline.  Securement device placed for the catheter.  The patient tolerated the procedure and was instructed to monitor for pain or discomfort      Dorota Myers LPN

## 2023-09-20 NOTE — Clinical Note
"9/20/2023       RE: Felix Millard  [address]  [place] [address]     Dear Colleague,    Thank you for referring your patient, Felix Millard, to the [facility] [place] at [facility]. Please see a copy of my visit note below.    CHIEF COMPLAINT   Felix Millard who is a 73 year old male returns today for follow-up of ***.      HPI   Felix Millard is a 73 year old male who presents with a history of {Diagnoses:879723}.  ***    PHYSICAL EXAM  Patient is a 73 year old  male   Vitals: Blood pressure (!) 160/68, pulse 62, height 1.93 m (6' 4\"), weight 97.5 kg (215 lb), SpO2 98 %.  Body mass index is 26.17 kg/m .  General Appearance Adult:   Alert, no acute distress, oriented  HENT: throat/mouth:normal, good dentition  Lungs: no respiratory distress, or pursed lip breathing  Heart: No obvious jugular venous distension present  Abdomen: nondistended  Back: *** no  Musculoskeltal: extremities normal, no peripheral edema  Skin: no suspicious lesions or rashes  Neuro: Alert, oriented, speech and mentation normal  Psych: affect and mood normal  Gait: Normal  : {NAV EXAM MALE GENITAL:559941}    All pertinent imaging reviewed:    Ct urogram 8/22/2023  IMPRESSION:   1.  Multiple bladder wall trabeculations and small bladder  diverticuli. Findings suspicious for acute cystitis with mild  enhancement, wall thickening and fat stranding most prominently around  the diverticuli in the anterior bladder wall.  2.  No urinary system calculi, hydronephrosis, suspicious renal masses  or urothelial lesions in the upper tracts.  3.  Mild prostatomegaly.       ASSESSMENT and PLAN  ***      I spent over *** minutes with the patient.  Over half this time was spent on counseling regarding ***.    Guanaco Mercado MD   [facility] Urology  [facility] Phone: [phone]      CHIEF COMPLAINT   Felix Millard who is a 73 year " "old male returns today for follow-up of ***.      HPI   Felix Millard is a 73 year old male who presents with a history of {Diagnoses:102550}.  ***    PHYSICAL EXAM  Patient is a 73 year old  male   Vitals: Blood pressure (!) 160/68, pulse 62, height 1.93 m (6' 4\"), weight 97.5 kg (215 lb), SpO2 98 %.  Body mass index is 26.17 kg/m .  General Appearance Adult:   Alert, no acute distress, oriented  HENT: throat/mouth:normal, good dentition  Lungs: no respiratory distress, or pursed lip breathing  Heart: No obvious jugular venous distension present  Abdomen: nondistended  Back: *** no  Musculoskeltal: extremities normal, no peripheral edema  Skin: no suspicious lesions or rashes  Neuro: Alert, oriented, speech and mentation normal  Psych: affect and mood normal  Gait: Normal  : {NAV EXAM MALE GENITAL:946395}    All pertinent imaging reviewed:    Ct urogram 8/22/2023  IMPRESSION:   1.  Multiple bladder wall trabeculations and small bladder  diverticuli. Findings suspicious for acute cystitis with mild  enhancement, wall thickening and fat stranding most prominently around  the diverticuli in the anterior bladder wall.  2.  No urinary system calculi, hydronephrosis, suspicious renal masses  or urothelial lesions in the upper tracts.  3.  Mild prostatomegaly.    PRE-PROCEDURE DIAGNOSIS: LUTS, bladder wall thickening    POST-PROCEDURE DIAGNOSIS: LUTS, bladder wall thickening    PROCEDURE: Cystoscopy, with urethral dilation and Upckett placement    DESCRIPTION OF PROCEDURE: After informed consent was obtained, the patient was brought to the procedure room where he was placed in the supine position with all pressure points well padded.  The penis was prepped and draped in sterile fashion.     I attempted to pass the scope into the urethra but immediately found a short tight fossa navicularis stricture. I then offered urethral dilation. The urethra was serially dilated with allyn sounds from 16 Fr up to 22 Fr. The " scope was then able to pass through the urethra. Remainder of anterior urethra was normal. Prostatic urethra relatively short about 2.5 cm with moderately obstructive bilobar hypertrophy. Bladder had cloudy urine limiting visibility. There was severe trabeculation and numerous cellules. Scope removed and ferguson was placed          The flexible cystoscope was removed and the findings were described to the patient.          ASSESSMENT and PLAN  ***      I spent over *** minutes with the patient.  Over half this time was spent on counseling regarding ***.    Guanaco Mercado MD   [facility] Urology  [facility] Phone: [phone]        Again, thank you for allowing me to participate in the care of your patient.      Sincerely,    Guanaco Mercado MD

## 2023-09-20 NOTE — TELEPHONE ENCOUNTER
Patient called nurse line and reported to this nurse that he is feeling the urge to pee, but nothing comes out. Explained to patient that these are called Bladder Spasms. Patient reports that he has drank a bottle of water since leaving our clinic and only has about 100ml in his bag. Explained to patient to be patient and see if in an hour the urine has drained into bag. Patient will call us back if urine is not draining.     Lili Serna LPN

## 2023-09-20 NOTE — PATIENT INSTRUCTIONS

## 2023-09-22 ENCOUNTER — ALLIED HEALTH/NURSE VISIT (OUTPATIENT)
Dept: UROLOGY | Facility: CLINIC | Age: 73
End: 2023-09-22
Payer: MEDICARE

## 2023-09-22 DIAGNOSIS — N35.914 STRICTURE OF ANTERIOR URETHRA IN MALE, UNSPECIFIED STRICTURE TYPE: Primary | ICD-10-CM

## 2023-09-22 PROCEDURE — 99207 PR NO CHARGE NURSE ONLY: CPT

## 2023-09-22 NOTE — PROGRESS NOTES
Felix Millard comes into clinic today for Incomplete Bladder Emptying  at the request of  Ordering Provider for Catheter Removal.  This service provided today was under the supervising provider of the day , who was available if needed.      Catheter removal documentation on 9/22/2023:    Felix Millard presents to the clinic for catheter removal.  Reason for removal: removal after surgery  Order has been verified. yes  Catheter successfully removed at 9:15 AM without immediate complication.  20 cc's of urine present in the catheter bag.  Urethral meatus is free of secretions and encrustation.  The patient is afebrile.  The patient tolerated the procedure and was instructed to return or call for pain, fever, leakage or decreased urine flow    Laura Butt LPN

## 2023-09-28 ENCOUNTER — OFFICE VISIT (OUTPATIENT)
Dept: FAMILY MEDICINE | Facility: CLINIC | Age: 73
End: 2023-09-28

## 2023-09-28 VITALS
BODY MASS INDEX: 26.19 KG/M2 | HEART RATE: 56 BPM | DIASTOLIC BLOOD PRESSURE: 87 MMHG | OXYGEN SATURATION: 98 % | WEIGHT: 215.2 LBS | SYSTOLIC BLOOD PRESSURE: 131 MMHG

## 2023-09-28 DIAGNOSIS — H60.332 CHRONIC SWIMMER'S EAR OF LEFT SIDE: Primary | ICD-10-CM

## 2023-09-28 DIAGNOSIS — N13.9 URINARY OBSTRUCTION: ICD-10-CM

## 2023-09-28 PROCEDURE — 99214 OFFICE O/P EST MOD 30 MIN: CPT | Performed by: FAMILY MEDICINE

## 2023-10-31 ENCOUNTER — TELEPHONE (OUTPATIENT)
Dept: UROLOGY | Facility: CLINIC | Age: 73
End: 2023-10-31
Payer: MEDICARE

## 2023-10-31 NOTE — TELEPHONE ENCOUNTER
Patient is calling, the sx's he was having when he came in for the Cystoscopy and had a dilation were better for a short time. But now they are back. He wonders what the next step would be?  Laura Butt, JOEN

## 2023-11-03 ENCOUNTER — OFFICE VISIT (OUTPATIENT)
Dept: UROLOGY | Facility: CLINIC | Age: 73
End: 2023-11-03
Payer: MEDICARE

## 2023-11-03 VITALS
BODY MASS INDEX: 25.57 KG/M2 | DIASTOLIC BLOOD PRESSURE: 76 MMHG | HEIGHT: 76 IN | SYSTOLIC BLOOD PRESSURE: 132 MMHG | WEIGHT: 210 LBS

## 2023-11-03 DIAGNOSIS — R30.0 DIFFICULT OR PAINFUL URINATION: ICD-10-CM

## 2023-11-03 DIAGNOSIS — R35.0 BENIGN PROSTATIC HYPERPLASIA WITH URINARY FREQUENCY: ICD-10-CM

## 2023-11-03 DIAGNOSIS — N40.1 BENIGN PROSTATIC HYPERPLASIA WITH URINARY FREQUENCY: ICD-10-CM

## 2023-11-03 DIAGNOSIS — N35.914 STRICTURE OF ANTERIOR URETHRA IN MALE, UNSPECIFIED STRICTURE TYPE: Primary | ICD-10-CM

## 2023-11-03 LAB
ALBUMIN UR-MCNC: 100 MG/DL
APPEARANCE UR: CLEAR
BILIRUB UR QL STRIP: NEGATIVE
COLOR UR AUTO: YELLOW
GLUCOSE UR STRIP-MCNC: NEGATIVE MG/DL
HGB UR QL STRIP: ABNORMAL
KETONES UR STRIP-MCNC: NEGATIVE MG/DL
LEUKOCYTE ESTERASE UR QL STRIP: ABNORMAL
NITRATE UR QL: NEGATIVE
PH UR STRIP: 5.5 [PH] (ref 5–7)
RESIDUAL VOLUME (RV) (EXTERNAL): 54
SP GR UR STRIP: 1.02 (ref 1–1.03)
UROBILINOGEN UR STRIP-ACNC: 0.2 E.U./DL

## 2023-11-03 PROCEDURE — 51798 US URINE CAPACITY MEASURE: CPT | Performed by: STUDENT IN AN ORGANIZED HEALTH CARE EDUCATION/TRAINING PROGRAM

## 2023-11-03 PROCEDURE — 99214 OFFICE O/P EST MOD 30 MIN: CPT | Mod: 25 | Performed by: STUDENT IN AN ORGANIZED HEALTH CARE EDUCATION/TRAINING PROGRAM

## 2023-11-03 PROCEDURE — 81003 URINALYSIS AUTO W/O SCOPE: CPT | Mod: QW | Performed by: STUDENT IN AN ORGANIZED HEALTH CARE EDUCATION/TRAINING PROGRAM

## 2023-11-03 RX ORDER — NITROFURANTOIN 25; 75 MG/1; MG/1
100 CAPSULE ORAL 2 TIMES DAILY
Qty: 14 CAPSULE | Refills: 0 | Status: SHIPPED | OUTPATIENT
Start: 2023-11-03 | End: 2023-11-10

## 2023-11-03 RX ORDER — TAMSULOSIN HYDROCHLORIDE 0.4 MG/1
0.4 CAPSULE ORAL DAILY
Qty: 90 CAPSULE | Refills: 3 | Status: SHIPPED | OUTPATIENT
Start: 2023-11-03

## 2023-11-03 ASSESSMENT — PAIN SCALES - GENERAL: PAINLEVEL: NO PAIN (0)

## 2023-11-03 NOTE — NURSING NOTE
Chief Complaint   Patient presents with    Urethral stricture      Pt states he is having pain with urination, pt states he takes excedrin and this helps    PVR: 54 mL    Olimpia Lopez, CMA

## 2023-11-03 NOTE — PROGRESS NOTES
"CHIEF COMPLAINT   Felix Millard who is a 73 year old male returns today for follow-up of LUTS, microhematuria, fossa navicularis urethral stricture s/p dilation 9/20/2023.      HPI   Felix Millard is a 73 year old male returns today for follow-up of LUTS, microhematuria, fossa navicularis urethral stricture s/p dilation 9/20/2023.      Worsening LUTS especially weak stream, nocturia. He is having severe urinary frequency, and feeling of incomplete emptying    PHYSICAL EXAM  Patient is a 73 year old  male   Vitals: Blood pressure 132/76, height 1.93 m (6' 4\"), weight 95.3 kg (210 lb).  Body mass index is 25.56 kg/m .  General Appearance Adult:   Alert, no acute distress, oriented  HENT: throat/mouth:normal, good dentition  Lungs: no respiratory distress, or pursed lip breathing  Heart: No obvious jugular venous distension present  Abdomen: nondistended  Musculoskeltal: extremities normal, no peripheral edema  Skin: no suspicious lesions or rashes  Neuro: Alert, oriented, speech and mentation normal  Psych: patient is very anxious  Gait: Normal  : deferred      PVR 54 ml    ASSESSMENT and PLAN  73 year old male returns today for follow-up of LUTS, microhematuria, fossa navicularis urethral stricture s/p dilation 9/20/2023.      Worsening symptoms especially severe urgency at night    I initially suspected these symptoms were due to recurrent stricture. I offered the patient trial of intermittent self catheterization to self dilate the suspected recurrent fossa navicularis, vs OR for cystoscopy and dilation vs. Office cystoscopy and repeat dilation, vs. Referral to  recon service to consider urethroplasty. However, symptoms could potentially be related to UTI or due to BPH, so will try treatment of those first before coming back for  cystoscopy    Patient is requesting an empiric antibiotic., will restart macrobid    Also will start tamsulosin empirically (has moderately obstructive prostate on cysto)    - " empiric Macrobid   - start tamsulosin  - if refractory symptoms, should call for repeat office cystoscopy possible dilation    Guanaco Mercado MD   [facility] Urology  [facility] Phone: [phone]

## 2023-11-03 NOTE — PATIENT INSTRUCTIONS
73 year old male returns today for follow-up of LUTS, microhematuria, fossa navicularis urethral stricture s/p dilation 9/20/2023.      I initially suspected these symptoms were due to recurrent stricture. I offered the patient trial of intermittent self catheterization to self dilate the suspected recurrent fossa navicularis, vs OR for cystoscopy and dilation vs. Office cystoscopy and repeat dilation, vs. Referral to  recon service to consider urethroplasty. However, symptoms could potentially be related to UTI or due to BPH, so will try treatment of those first before coming back for  cystoscopy    Patient is requesting an empiric antibiotic., will restart macrobid    Also will start tamsulosin empirically (has moderately obstructive prostate on cysto)    - empiric Macrobid   - start tamsulosin  - if your symptoms are not better in a week call the office to schedule cystoscopy possible dilation

## 2023-11-03 NOTE — LETTER
"11/3/2023       RE: Felix Millard  [address]  [place] [address]     Dear Colleague,    Thank you for referring your patient, Felix Millard, to the [facility] UROLOGY CLINIC [place] at [facility]. Please see a copy of my visit note below.    CHIEF COMPLAINT   Felix Millard who is a 73 year old male returns today for follow-up of LUTS, microhematuria, fossa navicularis urethral stricture s/p dilation 9/20/2023.      HPI   Felix Millard is a 73 year old male returns today for follow-up of LUTS, microhematuria, fossa navicularis urethral stricture s/p dilation 9/20/2023.      Worsening LUTS especially weak stream, nocturia. He is having severe urinary frequency, and feeling of incomplete emptying    PHYSICAL EXAM  Patient is a 73 year old  male   Vitals: Blood pressure 132/76, height 1.93 m (6' 4\"), weight 95.3 kg (210 lb).  Body mass index is 25.56 kg/m .  General Appearance Adult:   Alert, no acute distress, oriented  HENT: throat/mouth:normal, good dentition  Lungs: no respiratory distress, or pursed lip breathing  Heart: No obvious jugular venous distension present  Abdomen: nondistended  Musculoskeltal: extremities normal, no peripheral edema  Skin: no suspicious lesions or rashes  Neuro: Alert, oriented, speech and mentation normal  Psych: patient is very anxious  Gait: Normal  : deferred      PVR 54 ml    ASSESSMENT and PLAN  73 year old male returns today for follow-up of LUTS, microhematuria, fossa navicularis urethral stricture s/p dilation 9/20/2023.      Worsening symptoms especially severe urgency at night    I initially suspected these symptoms were due to recurrent stricture. I offered the patient trial of intermittent self catheterization to self dilate the suspected recurrent fossa navicularis, vs OR for cystoscopy and dilation vs. Office cystoscopy and repeat dilation, vs. Referral to  recon service to " consider urethroplasty. However, symptoms could potentially be related to UTI or due to BPH, so will try treatment of those first before coming back for  cystoscopy    Patient is requesting an empiric antibiotic., will restart macrobid    Also will start tamsulosin empirically (has moderately obstructive prostate on cysto)    - empiric Macrobid   - start tamsulosin  - if refractory symptoms, should call for repeat office cystoscopy possible dilation    Guanaco Mercado MD   [facility] Urology  [facility] Phone: [phone]

## 2024-03-13 ENCOUNTER — OFFICE VISIT (OUTPATIENT)
Dept: FAMILY MEDICINE | Facility: CLINIC | Age: 74
End: 2024-03-13

## 2024-03-13 VITALS
HEIGHT: 75 IN | WEIGHT: 212.2 LBS | BODY MASS INDEX: 26.38 KG/M2 | DIASTOLIC BLOOD PRESSURE: 64 MMHG | HEART RATE: 55 BPM | SYSTOLIC BLOOD PRESSURE: 136 MMHG | OXYGEN SATURATION: 99 %

## 2024-03-13 DIAGNOSIS — E78.5 DYSLIPIDEMIA: ICD-10-CM

## 2024-03-13 DIAGNOSIS — F10.21 ALCOHOL DEPENDENCE IN REMISSION (H): ICD-10-CM

## 2024-03-13 DIAGNOSIS — H60.541 DERMATITIS OF EAR CANAL, RIGHT: ICD-10-CM

## 2024-03-13 DIAGNOSIS — Z13.6 SCREENING FOR HEART DISEASE: ICD-10-CM

## 2024-03-13 DIAGNOSIS — F33.42 RECURRENT MAJOR DEPRESSIVE DISORDER, IN FULL REMISSION (H): Primary | ICD-10-CM

## 2024-03-13 DIAGNOSIS — R23.1 PALE SKIN: ICD-10-CM

## 2024-03-13 LAB
% GRANULOCYTES: 65 % (ref 42.2–75.2)
ALBUMIN SERPL BCG-MCNC: 4.4 G/DL (ref 3.5–5.2)
ALP SERPL-CCNC: 59 U/L (ref 40–150)
ALT SERPL W P-5'-P-CCNC: 16 U/L (ref 0–70)
ANION GAP SERPL CALCULATED.3IONS-SCNC: 10 MMOL/L (ref 7–15)
AST SERPL W P-5'-P-CCNC: 22 U/L (ref 0–45)
BILIRUB SERPL-MCNC: 0.7 MG/DL
BUN SERPL-MCNC: 22 MG/DL (ref 8–23)
CALCIUM SERPL-MCNC: 9.2 MG/DL (ref 8.8–10.2)
CHLORIDE SERPL-SCNC: 105 MMOL/L (ref 98–107)
CHOLESTEROL: 198 MG/DL (ref 100–199)
CREAT SERPL-MCNC: 1.16 MG/DL (ref 0.67–1.17)
DEPRECATED HCO3 PLAS-SCNC: 22 MMOL/L (ref 22–29)
EGFRCR SERPLBLD CKD-EPI 2021: 65 ML/MIN/1.73M2
FASTING?: YES
GLUCOSE SERPL-MCNC: 109 MG/DL (ref 70–99)
HCT VFR BLD AUTO: 45 % (ref 39–51)
HDL (RMG): 39 MG/DL (ref 40–?)
HEMOGLOBIN: 15.4 G/DL (ref 13.4–17.5)
LDL CALCULATED (RMG): 126 MG/DL (ref 0–130)
LYMPHOCYTES NFR BLD AUTO: 27 % (ref 20.5–51.1)
MCH RBC QN AUTO: 31.9 PG (ref 27–31)
MCHC RBC AUTO-ENTMCNC: 34.2 G/DL (ref 33–37)
MCV RBC AUTO: 93.3 FL (ref 80–100)
MONOCYTES NFR BLD AUTO: 8 % (ref 1.7–9.3)
PLATELET # BLD AUTO: 259 K/UL (ref 140–450)
POTASSIUM SERPL-SCNC: 4.6 MMOL/L (ref 3.4–5.3)
PROT SERPL-MCNC: 7.4 G/DL (ref 6.4–8.3)
RBC # BLD AUTO: 4.83 X10/CMM (ref 4.2–5.9)
SODIUM SERPL-SCNC: 137 MMOL/L (ref 135–145)
TRIGLYCERIDES (RMG): 163 MG/DL (ref 0–149)
WBC # BLD AUTO: 7.2 X10/CMM (ref 3.8–11)

## 2024-03-13 PROCEDURE — 85025 COMPLETE CBC W/AUTO DIFF WBC: CPT | Performed by: FAMILY MEDICINE

## 2024-03-13 PROCEDURE — 36415 COLL VENOUS BLD VENIPUNCTURE: CPT | Performed by: FAMILY MEDICINE

## 2024-03-13 PROCEDURE — 99213 OFFICE O/P EST LOW 20 MIN: CPT | Performed by: FAMILY MEDICINE

## 2024-03-13 PROCEDURE — 80053 COMPREHEN METABOLIC PANEL: CPT | Mod: ORL | Performed by: FAMILY MEDICINE

## 2024-03-13 PROCEDURE — 80061 LIPID PANEL: CPT | Mod: QW | Performed by: FAMILY MEDICINE

## 2024-03-13 RX ORDER — FLUOCINOLONE ACETONIDE 0.11 MG/ML
5 OIL AURICULAR (OTIC)
Qty: 5 ML | Refills: 0 | Status: SHIPPED | OUTPATIENT
Start: 2024-03-13

## 2024-03-13 RX ORDER — DEXTROAMPHETAMINE SACCHARATE, AMPHETAMINE ASPARTATE, DEXTROAMPHETAMINE SULFATE AND AMPHETAMINE SULFATE 5; 5; 5; 5 MG/1; MG/1; MG/1; MG/1
TABLET ORAL
COMMUNITY
Start: 2024-02-16

## 2024-03-13 ASSESSMENT — ANXIETY QUESTIONNAIRES
7. FEELING AFRAID AS IF SOMETHING AWFUL MIGHT HAPPEN: SEVERAL DAYS
2. NOT BEING ABLE TO STOP OR CONTROL WORRYING: NEARLY EVERY DAY
IF YOU CHECKED OFF ANY PROBLEMS ON THIS QUESTIONNAIRE, HOW DIFFICULT HAVE THESE PROBLEMS MADE IT FOR YOU TO DO YOUR WORK, TAKE CARE OF THINGS AT HOME, OR GET ALONG WITH OTHER PEOPLE: VERY DIFFICULT
5. BEING SO RESTLESS THAT IT IS HARD TO SIT STILL: NOT AT ALL
GAD7 TOTAL SCORE: 12
6. BECOMING EASILY ANNOYED OR IRRITABLE: NEARLY EVERY DAY
1. FEELING NERVOUS, ANXIOUS, OR ON EDGE: SEVERAL DAYS
3. WORRYING TOO MUCH ABOUT DIFFERENT THINGS: NEARLY EVERY DAY
GAD7 TOTAL SCORE: 12

## 2024-03-13 ASSESSMENT — PATIENT HEALTH QUESTIONNAIRE - PHQ9
SUM OF ALL RESPONSES TO PHQ QUESTIONS 1-9: 10
5. POOR APPETITE OR OVEREATING: SEVERAL DAYS

## 2024-03-13 NOTE — PROGRESS NOTES
"Here to check on his ongoing left ear symptoms.  Also is getting ritalin and antidepressents from psych clinic  ROS:  General and Resp. completed and negative except as noted above    Histories: reviewed    OBJECTIVE:                                                    /64   Pulse 55   Ht 1.905 m (6' 3\")   Wt 96.3 kg (212 lb 3.2 oz)   SpO2 99%   BMI 26.52 kg/m    Body mass index is 26.52 kg/m .   APPEARANCE: = alert, mild distress, and pale  Ear canals and TM's = external ear canal inflamed left  Neck = No anterior or posterior adenopathy appreciated.  Thyroid = Not enlarged and no masses felt  Resp effort = Calm regular breathing     ASSESSMENT/PLAN:                                                    Quality gaps reviewed    Chemo was seen today for ear problem.    Diagnoses and all orders for this visit:    Recurrent major depressive disorder, in full remission (H24)  Tolerating Depression medication with a good response to this treatment.  PHQ-9 score:        3/13/2024    10:14 AM   PHQ   PHQ-9 Total Score 10   Q9: Thoughts of better off dead/self-harm past 2 weeks Not at all    today and we agreed that we will continue current treatment. We discussed ongoing management of his medications and how we will refill the medications now and in the future.   Next scheduled follow up in 6 months.  We breifly reviewed any questions he had about mood disorders including suspected pathophysiology, role of neurotransmitters, and treatment options including medication options ( especially SSRIs that treat cause of sx) and counselling.  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

## 2024-03-13 NOTE — LETTER
March 15, 2024      Chemo Millard  [address]   [place] [address]            Dear Felix,     I am writing to report that your included test results are as expected.    Many labs contain some results that are slightly outside of the normal range, I have reviewed any of these results and they require no changes at this time.         Resulted Orders   CBC with Diff/Plt ([facility])   Result Value Ref Range    WBC x10/cmm 7.2 3.8 - 11.0 x10/cmm    % Lymphocytes 27.0 20.5 - 51.1 %    % Monocytes 8.0 1.7 - 9.3 %    % Granulocytes 65.0 42.2 - 75.2 %    RBC x10/cmm 4.83 4.2 - 5.9 x10/cmm    Hemoglobin 15.4 13.4 - 17.5 g/dl    Hematocrit 45.0 39 - 51 %    MCV 93.3 80 - 100 fL    MCH 31.9 (A) 27.0 - 31.0 pg    MCHC 34.2 33.0 - 37.0 g/dL    Platelet Count 259 140 - 450 K/uL   Lipid Profile ([facility])   Result Value Ref Range    Cholesterol 198 100 - 199 mg/dL    HDL 39 (A) 40 mg/dL    Triglycerides 163 (A) 0 - 149 mg/dL    LDL CALCULATED ([facility]) 126 0 - 130 mg/dL    Patient Fasting? Yes    Comprehensive metabolic panel   Result Value Ref Range    Sodium 137 135 - 145 mmol/L      Comment:      Reference intervals for this test were updated on 09/26/2023 to more accurately reflect our healthy population. There may be differences in the flagging of prior results with similar values performed with this method. Interpretation of those prior results can be made in the context of the updated reference intervals.     Potassium 4.6 3.4 - 5.3 mmol/L    Carbon Dioxide (CO2) 22 22 - 29 mmol/L    Anion Gap 10 7 - 15 mmol/L    Urea Nitrogen 22.0 8.0 - 23.0 mg/dL    Creatinine 1.16 0.67 - 1.17 mg/dL    GFR Estimate 65 >60 mL/min/1.73m2    Calcium 9.2 8.8 - 10.2 mg/dL    Chloride 105 98 - 107 mmol/L    Glucose 109 (H) 70 - 99 mg/dL    Alkaline Phosphatase 59 40 - 150 U/L      Comment:      Reference intervals for this test were updated on 11/14/2023 to more accurately reflect our healthy population. There may be differences in  the flagging of prior results with similar values performed with this method. Interpretation of those prior results can be made in the context of the updated reference intervals.    AST 22 0 - 45 U/L      Comment:      Reference intervals for this test were updated on 6/12/2023 to more accurately reflect our healthy population. There may be differences in the flagging of prior results with similar values performed with this method. Interpretation of those prior results can be made in the context of the updated reference intervals.    ALT 16 0 - 70 U/L      Comment:      Reference intervals for this test were updated on 6/12/2023 to more accurately reflect our healthy population. There may be differences in the flagging of prior results with similar values performed with this method. Interpretation of those prior results can be made in the context of the updated reference intervals.      Protein Total 7.4 6.4 - 8.3 g/dL    Albumin 4.4 3.5 - 5.2 g/dL    Bilirubin Total 0.7 <=1.2 mg/dL       If you have any questions or concerns, please call the clinic at the number listed above.       Sincerely,      Marvel De La Fuente MD

## 2024-03-14 NOTE — RESULT ENCOUNTER NOTE
Dear Felix,     I am writing to report that your included test results are as expected.    Many labs contain some results that are slightly outside of the normal range, I have reviewed any of these results and they require no changes at this time.    Marvel De La Fuente MD

## 2024-11-25 ENCOUNTER — OFFICE VISIT (OUTPATIENT)
Dept: FAMILY MEDICINE | Facility: CLINIC | Age: 74
End: 2024-11-25

## 2024-11-25 VITALS
HEIGHT: 76 IN | HEART RATE: 49 BPM | WEIGHT: 209.2 LBS | DIASTOLIC BLOOD PRESSURE: 99 MMHG | OXYGEN SATURATION: 99 % | BODY MASS INDEX: 25.47 KG/M2 | SYSTOLIC BLOOD PRESSURE: 150 MMHG

## 2024-11-25 DIAGNOSIS — R39.9 LOWER URINARY TRACT SYMPTOMS (LUTS): Primary | ICD-10-CM

## 2024-11-25 LAB
BACTERIA (RMG): ABNORMAL
BILIRUBIN (RMG): NEGATIVE
BLOOD (RMG): ABNORMAL
CASTS (RMG): ABNORMAL
COLOR UR: YELLOW
CRYSTAL (RMG): ABNORMAL
EPITHELIAL (RMG): ABNORMAL
GLUCOSE (RMG): NEGATIVE
KETONE (RMG): NEGATIVE
LEUKOCYTE (RMG): ABNORMAL
MUCOUS (RMG): ABNORMAL
NITRITE (RMG): NEGATIVE
PH UR STRIP: 5.5 PH (ref 5–7)
PROTEIN (RMG): ABNORMAL
RBC (RMG): ABNORMAL
SP GR UR STRIP: 1.03
UROBILINOGEN (RMG): 0.2
WBC (RMG): ABNORMAL

## 2024-11-25 PROCEDURE — 99213 OFFICE O/P EST LOW 20 MIN: CPT | Performed by: STUDENT IN AN ORGANIZED HEALTH CARE EDUCATION/TRAINING PROGRAM

## 2024-11-25 PROCEDURE — 81003 URINALYSIS AUTO W/O SCOPE: CPT | Performed by: STUDENT IN AN ORGANIZED HEALTH CARE EDUCATION/TRAINING PROGRAM

## 2024-11-25 PROCEDURE — 87086 URINE CULTURE/COLONY COUNT: CPT | Mod: ORL | Performed by: STUDENT IN AN ORGANIZED HEALTH CARE EDUCATION/TRAINING PROGRAM

## 2024-11-25 RX ORDER — GRANULES FOR ORAL 3 G/1
3 POWDER ORAL ONCE
Qty: 1 PACKET | Refills: 0 | Status: SHIPPED | OUTPATIENT
Start: 2024-11-25 | End: 2024-11-25

## 2024-11-25 ASSESSMENT — ENCOUNTER SYMPTOMS
HEMATURIA: 0
BACK PAIN: 0
FEVER: 0
DYSURIA: 1
FREQUENCY: 1

## 2024-11-25 NOTE — PROGRESS NOTES
"  Assessment & Plan   Problem List Items Addressed This Visit       Lower urinary tract symptoms (LUTS) - Primary     -patient has been having symptoms for 2-3 days  -denies fever or back pain   -elevated BP likely due to discomfort will monitor when feeling better  -UA consistent with UTI, discussed results with patient during appt  -will order fosfomycin, discussed that it's a powder  -patient to let us know if he develops fevers, back pain or does not improve on current regimen  -will send urine out for culture with reflex to sensitivities         Relevant Medications    fosfomycin (MONUROL) 3 g Packet    Other Relevant Orders    Urinalysis ([facility]) (Completed)    Urine Culture             BMI  Estimated body mass index is 25.46 kg/m  as calculated from the following:    Height as of this encounter: 1.93 m (6' 4\").    Weight as of this encounter: 94.9 kg (209 lb 3.2 oz).       Return if symptoms worsen or fail to improve.      Rahat Mclain is a 74 year old, presenting for the following health issues:  LUTS (Increased frequency and pain with urination since 11/23/Denies fever )    HPI     Patient states that he's been experiencing urinary frequency and dysuria for 2-3 days. Patient denies hematuria, fevers and back pain.    Review of Systems   Constitutional:  Negative for fever.   Genitourinary:  Positive for dysuria and frequency. Negative for hematuria.   Musculoskeletal:  Negative for back pain.           Objective    BP (!) 150/99   Pulse (!) 49   Ht 1.93 m (6' 4\")   Wt 94.9 kg (209 lb 3.2 oz)   SpO2 99%   BMI 25.46 kg/m    Body mass index is 25.46 kg/m .  Physical Exam  Constitutional:       General: He is not in acute distress.     Appearance: Normal appearance.   Cardiovascular:      Rate and Rhythm: Normal rate and regular rhythm.      Heart sounds: Normal heart sounds. No murmur heard.  Pulmonary:      Effort: Pulmonary effort is normal. No respiratory distress.      Breath sounds: Normal breath " sounds.   Abdominal:      Tenderness: There is no abdominal tenderness. There is no right CVA tenderness or left CVA tenderness.   Neurological:      Mental Status: He is alert.          Signed Electronically by: Britney Messina DO

## 2024-11-25 NOTE — ASSESSMENT & PLAN NOTE
-patient has been having symptoms for 2-3 days  -denies fever or back pain   -elevated BP likely due to discomfort will monitor when feeling better  -UA consistent with UTI, discussed results with patient during appt  -will order fosfomycin, discussed that it's a powder  -patient to let us know if he develops fevers, back pain or does not improve on current regimen  -will send urine out for culture with reflex to sensitivities

## 2024-11-26 LAB — BACTERIA UR CULT: NO GROWTH

## 2024-12-10 ENCOUNTER — TELEPHONE (OUTPATIENT)
Dept: UROLOGY | Facility: CLINIC | Age: 74
End: 2024-12-10
Payer: MEDICARE

## 2024-12-10 DIAGNOSIS — N40.1 BENIGN PROSTATIC HYPERPLASIA WITH URINARY FREQUENCY: ICD-10-CM

## 2024-12-10 DIAGNOSIS — R35.0 BENIGN PROSTATIC HYPERPLASIA WITH URINARY FREQUENCY: ICD-10-CM

## 2024-12-10 RX ORDER — TAMSULOSIN HYDROCHLORIDE 0.4 MG/1
0.4 CAPSULE ORAL DAILY
Qty: 14 CAPSULE | Refills: 0 | Status: SHIPPED | OUTPATIENT
Start: 2024-12-10 | End: 2024-12-24

## 2024-12-10 RX ORDER — TAMSULOSIN HYDROCHLORIDE 0.4 MG/1
0.4 CAPSULE ORAL DAILY
Qty: 90 CAPSULE | Refills: 0 | Status: SHIPPED | OUTPATIENT
Start: 2024-12-10 | End: 2024-12-10

## 2024-12-10 NOTE — TELEPHONE ENCOUNTER
Last seen by Urology on 11/3/2023. 12/27/2023 appt was canceled.   Patient called back, encouraged to schedule an appt.  Appt is now scheduled for 12/18/24. Sent 14 days of Tamsulosin to his pharmacy to get him through to his appt.     Thank you,   Shraddha JAMISON, RN  [place]

## 2024-12-18 ENCOUNTER — OFFICE VISIT (OUTPATIENT)
Dept: UROLOGY | Facility: CLINIC | Age: 74
End: 2024-12-18
Payer: MEDICARE

## 2024-12-18 VITALS
BODY MASS INDEX: 25.57 KG/M2 | SYSTOLIC BLOOD PRESSURE: 135 MMHG | WEIGHT: 210 LBS | DIASTOLIC BLOOD PRESSURE: 75 MMHG | OXYGEN SATURATION: 99 % | HEIGHT: 76 IN | HEART RATE: 49 BPM

## 2024-12-18 DIAGNOSIS — R35.0 BENIGN PROSTATIC HYPERPLASIA WITH URINARY FREQUENCY: ICD-10-CM

## 2024-12-18 DIAGNOSIS — N40.1 BENIGN PROSTATIC HYPERPLASIA WITH URINARY FREQUENCY: ICD-10-CM

## 2024-12-18 DIAGNOSIS — N35.914 STRICTURE OF ANTERIOR URETHRA IN MALE, UNSPECIFIED STRICTURE TYPE: Primary | ICD-10-CM

## 2024-12-18 LAB — RESIDUAL VOLUME (RV) (EXTERNAL): NORMAL

## 2024-12-18 PROCEDURE — 99213 OFFICE O/P EST LOW 20 MIN: CPT | Mod: 25 | Performed by: STUDENT IN AN ORGANIZED HEALTH CARE EDUCATION/TRAINING PROGRAM

## 2024-12-18 PROCEDURE — 51798 US URINE CAPACITY MEASURE: CPT | Performed by: STUDENT IN AN ORGANIZED HEALTH CARE EDUCATION/TRAINING PROGRAM

## 2024-12-18 RX ORDER — TAMSULOSIN HYDROCHLORIDE 0.4 MG/1
0.4 CAPSULE ORAL DAILY
Qty: 90 CAPSULE | Refills: 3 | Status: SHIPPED | OUTPATIENT
Start: 2024-12-18

## 2024-12-18 ASSESSMENT — PAIN SCALES - GENERAL: PAINLEVEL_OUTOF10: NO PAIN (0)

## 2024-12-18 NOTE — NURSING NOTE
Chief Complaint   Patient presents with    hx luts     Talk about medication refills    Pvr is 50ml done with bladder scan   Ciera Langley, CMA

## 2024-12-18 NOTE — PROGRESS NOTES
"CHIEF COMPLAINT   Felix Millard who is a 74 year old male returns today for follow-up of LUTS, microhematuria, fossa navicularis urethral stricture s/p dilation 9/20/2023.      HPI   Felix Millard is a 74 year old male returns today for follow-up of LUTS, microhematuria, fossa navicularis urethral stricture s/p dilation 9/20/2023.      Aua symptom score 1-2-3-4-1-1-2 = 14 qol pleased    Last seen a year ago. He had worsening symptoms but we started tamsulosin to see if symptoms would improve. He says this is working quite well    He had symptoms of a uti about a month ago and was treated with fosfomycin (he has listed allergies to amoxicillin and cipro but still not clear why this abx was chosen). Note final culture was no growth.      PHYSICAL EXAM  Patient is a 74 year old  male   Vitals: Blood pressure 135/75, pulse (!) 49, height 1.93 m (6' 4\"), weight 95.3 kg (210 lb), SpO2 99%.  Body mass index is 25.56 kg/m .  General Appearance Adult:   Alert, no acute distress, oriented  HENT: throat/mouth:normal, good dentition  Lungs: no respiratory distress, or pursed lip breathing  Heart: No obvious jugular venous distension present  Abdomen: nondistended  Musculoskeltal: extremities normal, no peripheral edema  Skin: no suspicious lesions or rashes  Neuro: Alert, oriented, speech and mentation normal  Psych: affect and mood normal  Gait: Normal  : circ phallus, orthotopic and patent meatus  Hard stool ball in rectum  Benign feeling prostate 45 g    All pertinent imaging reviewed:    Pvr 50 ml    ASSESSMENT and PLAN  74 year old male returns today for follow-up of LUTS, microhematuria, fossa navicularis urethral stricture s/p dilation 9/20/2023.       BPH with LUTS: doing well on tamsulosin 0.4 mg daily, will refill for a year but afterwards pcp can refill    H/o urethral stricture s/p dilation: emptying well, should return if worsening symptoms      Guanaco Mercado MD   [facility]  [facility] " [facility] Phone: [phone]

## 2025-07-24 ENCOUNTER — OFFICE VISIT (OUTPATIENT)
Dept: FAMILY MEDICINE | Facility: CLINIC | Age: 75
End: 2025-07-24

## 2025-07-24 VITALS
BODY MASS INDEX: 25.2 KG/M2 | SYSTOLIC BLOOD PRESSURE: 122 MMHG | DIASTOLIC BLOOD PRESSURE: 56 MMHG | TEMPERATURE: 98.8 F | OXYGEN SATURATION: 99 % | HEART RATE: 49 BPM | WEIGHT: 207 LBS

## 2025-07-24 DIAGNOSIS — F10.21 ALCOHOL DEPENDENCE IN REMISSION (H): ICD-10-CM

## 2025-07-24 DIAGNOSIS — R05.1 ACUTE COUGH: ICD-10-CM

## 2025-07-24 DIAGNOSIS — J06.9 VIRAL URI: Primary | ICD-10-CM

## 2025-07-24 LAB
INFLUENZA A (RMG): NEGATIVE
INFLUENZA B (RMG): NEGATIVE
SARS ANTIGEN (RMG): NEGATIVE

## 2025-07-24 RX ORDER — BENZONATATE 200 MG/1
200 CAPSULE ORAL 3 TIMES DAILY PRN
Qty: 30 CAPSULE | Refills: 0 | Status: SHIPPED | OUTPATIENT
Start: 2025-07-24

## 2025-07-24 RX ORDER — FLUTICASONE PROPIONATE 50 MCG
1 SPRAY, SUSPENSION (ML) NASAL DAILY
Qty: 16 G | Refills: 0 | Status: SHIPPED | OUTPATIENT
Start: 2025-07-24

## 2025-07-24 ASSESSMENT — PATIENT HEALTH QUESTIONNAIRE - PHQ9
SUM OF ALL RESPONSES TO PHQ QUESTIONS 1-9: 9
10. IF YOU CHECKED OFF ANY PROBLEMS, HOW DIFFICULT HAVE THESE PROBLEMS MADE IT FOR YOU TO DO YOUR WORK, TAKE CARE OF THINGS AT HOME, OR GET ALONG WITH OTHER PEOPLE: SOMEWHAT DIFFICULT
SUM OF ALL RESPONSES TO PHQ QUESTIONS 1-9: 9

## 2025-07-24 NOTE — PATIENT INSTRUCTIONS
Tyelnol 1000mg 4 times per day    Fluticasone(flonase) daily for 1 month'    Saline rinse      Benzonatate for the cough - this will be sent to your pharmacy

## 2025-07-24 NOTE — PROGRESS NOTES
"  Assessment & Plan     Viral URI  - sxs cares discussed, suspect more noticeable heart at night, given no symptoms with activity and no changes in heart sounds, rate or rhythm low suspicion for cardiac issue today  - Red flags that warrant emergent evaluation discussed, BP stable and oxygenating well  -Education about adverse effects of prescription medication discussed  -Patient verbalized understanding and is agreeable to the discussed plan of care.    Acute cough  - see above  - Influenza + SARS Antigen ([facility])  - benzonatate (TESSALON) 200 MG capsule  Dispense: 30 capsule; Refill: 0  - fluticasone (FLONASE) 50 MCG/ACT nasal spray  Dispense: 16 g; Refill: 0    Alcohol dependence in remission (H)  - denies SI/HI or any alcohol cravings       BMI  Estimated body mass index is 25.2 kg/m  as calculated from the following:    Height as of 12/18/24: 1.93 m (6' 4\").    Weight as of this encounter: 93.9 kg (207 lb).       Follow-up  No follow-ups on file.    Rahat Mclain is a 75 year old, presenting for the following health issues:  URI (Cough, congestion, body aches, fatigue, palpitations/Denies fever/chills, chest pains/Sx onset: 07/22/Has been using Excedrin PM with some effect)    HPI        1.) URI x 2 days.  Body aches and cough are worst sxs.  Feels palpitations only at night.  Denies chest pain, sob, dizziness, syncope.  Worried about COVID as he was very sick with covid last year.       Review of Systems  Constitutional, HEENT, cardiovascular, pulmonary, gi and gu systems are negative, except as otherwise noted.      Objective    /56   Pulse (!) 49   Temp 98.8  F (37.1  C)   Wt 93.9 kg (207 lb)   SpO2 99%   BMI 25.20 kg/m    Body mass index is 25.2 kg/m .  Physical Exam   GENERAL: alert and no distress, mildly ill appearing   EYES: Eyes grossly normal to inspection, PERRL and conjunctivae and sclerae normal  HENT: normal cephalic/atraumatic, both ears: clear effusion, nose and mouth without ulcers " or lesions, oropharynx clear, and oral mucous membranes moist  NECK: no adenopathy, no asymmetry, masses, or scars  RESP: lungs clear to auscultation - no rales, rhonchi or wheezes  CV: regular rate and rhythm, normal S1 S2, no S3 or S4, no murmur, click or rub, no peripheral edema  MS: no gross musculoskeletal defects noted, no edema  SKIN: no suspicious lesions or rashes  PSYCH: mentation appears normal, affect normal/bright    Recent Results (from the past 24 hours)   Influenza + SARS Antigen ([facility])   Result Value Ref Range    INFLUENZA A ([facility]) Negative Negative    INFLUENZA B ([facility]) Negative Negative    SARS ANTIGEN ([facility]) Negative Negative         The longitudinal plan of care for the diagnosis(es)/condition(s) as documented were addressed during this visit. Due to the added complexity in care, I will continue to support Chem[facility] in the subsequent management and with ongoing continuity of care.  Signed Electronically by: JAY Alvarez CNP